# Patient Record
Sex: MALE | Race: WHITE | NOT HISPANIC OR LATINO | Employment: FULL TIME | URBAN - METROPOLITAN AREA
[De-identification: names, ages, dates, MRNs, and addresses within clinical notes are randomized per-mention and may not be internally consistent; named-entity substitution may affect disease eponyms.]

---

## 2017-06-02 ENCOUNTER — OFFICE VISIT (OUTPATIENT)
Dept: URGENT CARE | Facility: PHYSICIAN GROUP | Age: 42
End: 2017-06-02
Payer: COMMERCIAL

## 2017-06-02 VITALS
BODY MASS INDEX: 27.59 KG/M2 | OXYGEN SATURATION: 97 % | HEART RATE: 104 BPM | WEIGHT: 215 LBS | HEIGHT: 74 IN | TEMPERATURE: 98.1 F | RESPIRATION RATE: 14 BRPM | SYSTOLIC BLOOD PRESSURE: 124 MMHG | DIASTOLIC BLOOD PRESSURE: 78 MMHG

## 2017-06-02 DIAGNOSIS — H10.33 ACUTE CONJUNCTIVITIS OF BOTH EYES, UNSPECIFIED ACUTE CONJUNCTIVITIS TYPE: ICD-10-CM

## 2017-06-02 DIAGNOSIS — J01.90 ACUTE SINUSITIS, RECURRENCE NOT SPECIFIED, UNSPECIFIED LOCATION: ICD-10-CM

## 2017-06-02 PROCEDURE — 99214 OFFICE O/P EST MOD 30 MIN: CPT | Performed by: NURSE PRACTITIONER

## 2017-06-02 RX ORDER — AZITHROMYCIN 250 MG/1
TABLET, FILM COATED ORAL
Qty: 6 TAB | Refills: 0 | Status: SHIPPED | OUTPATIENT
Start: 2017-06-02 | End: 2017-11-03

## 2017-06-02 RX ORDER — FLUTICASONE PROPIONATE 50 MCG
2 SPRAY, SUSPENSION (ML) NASAL DAILY
Qty: 1 BOTTLE | Refills: 0 | Status: SHIPPED | OUTPATIENT
Start: 2017-06-02 | End: 2018-11-05

## 2017-06-02 ASSESSMENT — ENCOUNTER SYMPTOMS
COUGH: 0
HOARSE VOICE: 1
BLURRED VISION: 0
CHILLS: 1
SHORTNESS OF BREATH: 0
EYE DISCHARGE: 1
NAUSEA: 0
DIAPHORESIS: 0
SWOLLEN GLANDS: 0
HEADACHES: 1
SINUS PRESSURE: 1
NECK PAIN: 0
DIZZINESS: 0
SORE THROAT: 1
EYE REDNESS: 1

## 2017-06-02 ASSESSMENT — LIFESTYLE VARIABLES: SUBSTANCE_ABUSE: 0

## 2017-06-02 NOTE — MR AVS SNAPSHOT
"        Montana Go   2017 8:35 AM   Office Visit   MRN: [id number]    Department:  [facility]   Dept Phone:  [phone]    Description:  Male : 1975   Provider:  TOBIN Leon           Reason for Visit     Sinus Problem pressure and pain with nasal drainage and a sore throat x 10 days       Allergies as of 2017     No Known Allergies      You were diagnosed with     Acute sinusitis, recurrence not specified, unspecified location   [2134974]       Acute conjunctivitis of both eyes, unspecified acute conjunctivitis type   [[id number]]         Vital Signs     Blood Pressure Pulse Temperature Respirations Height Weight    124/78 mmHg 104 36.7 °C (98.1 °F) 14 1.88 m (6' 2\") 97.523 kg (215 lb)    Body Mass Index Oxygen Saturation Smoking Status             27.59 kg/m2 97% Never Smoker          Basic Information     Date Of Birth Sex Race Ethnicity Preferred Language    1975 Male White Non- English      Problem List              ICD-10-CM Priority Class Noted - Resolved    Essential hypertension I10   11/3/2016 - Present      Health Maintenance        Date Due Completion Dates    IMM DTaP/Tdap/Td Vaccine (1 - Tdap) 1993 ---            Current Immunizations     No immunizations on file.      Below and/or attached are the medications your provider expects you to take. Review all of your home medications and newly ordered medications with your provider and/or pharmacist. Follow medication instructions as directed by your provider and/or pharmacist. Please keep your medication list with you and share with your provider. Update the information when medications are discontinued, doses are changed, or new medications (including over-the-counter products) are added; and carry medication information at all times in the event of emergency situations     Allergies:  No Known Allergies          Medications  Valid as of: 2017 -  9:03 AM    Generic Name Brand " Name Tablet Size Instructions for use    Azithromycin (Tab) ZITHROMAX 250 MG Take 2 tablets PO on day one, then 1 tablet PO on day two to five.        Fluticasone Propionate (Suspension) FLONASE 50 MCG/ACT Spray 2 Sprays in nose every day.        Lisinopril (Tab) PRINIVIL 5 MG Take 1 Tab by mouth every day.        .                 Medicines prescribed today were sent to:     [facility] [address] [place] [address] [id number] [facility] [place] [address]    Phone: [phone] Fax: [phone]    Open 24 Hours?: No      Medication refill instructions:       If your prescription bottle indicates you have medication refills left, it is not necessary to call your provider’s office. Please contact your pharmacy and they will refill your medication.    If your prescription bottle indicates you do not have any refills left, you may request refills at any time through one of the following ways: The online [employer] system (except Urgent Care), by calling your provider’s office, or by asking your pharmacy to contact your provider’s office with a refill request. Medication refills are processed only during regular business hours and may not be available until the next business day. Your provider may request additional information or to have a follow-up visit with you prior to refilling your medication.   *Please Note: Medication refills are assigned a new Rx number when refilled electronically. Your pharmacy may indicate that no refills were authorized even though a new prescription for the same medication is available at the pharmacy. Please request the medicine by name with the pharmacy before contacting your provider for a refill.           [employer] Access Code: Activation code not generated  Current [employer] Status: Active

## 2017-06-02 NOTE — PROGRESS NOTES
"Subjective:      Montana Go is a 42 y.o. male who presents with Sinus Problem    Chief Complaint   Patient presents with   • Sinus Problem     pressure and pain with nasal drainage and a sore throat x 10 days      PFSH reviewed and updated as necessary in EPIC electronic record with patient today.  Medications including OTC medications reviewed with patient.         No Known Allergies          Sinus Problem  This is a new problem. The current episode started 1 to 4 weeks ago. The problem has been rapidly worsening since onset. Maximum temperature: ? His pain is at a severity of 5/10. The pain is moderate. Associated symptoms include chills, congestion, ear pain, headaches, a hoarse voice, sinus pressure, sneezing and a sore throat. Pertinent negatives include no coughing, diaphoresis, neck pain, shortness of breath or swollen glands. Past treatments include acetaminophen and saline sprays (polymyxin eye drops from his wife for the eye redness and drainage. It has not improved his symptoms). The treatment provided mild relief.       Review of Systems   Constitutional: Positive for chills. Negative for diaphoresis.   HENT: Positive for congestion, ear pain, hoarse voice, sinus pressure, sneezing and sore throat.    Eyes: Positive for discharge and redness. Negative for blurred vision.   Respiratory: Negative for cough and shortness of breath.    Gastrointestinal: Negative for nausea.   Musculoskeletal: Negative for neck pain.   Neurological: Positive for headaches. Negative for dizziness.   Endo/Heme/Allergies: Negative for environmental allergies.   Psychiatric/Behavioral: Negative for substance abuse.          Objective:     /78 mmHg  Pulse 104  Temp(Src) 36.7 °C (98.1 °F)  Resp 14  Ht 1.88 m (6' 2\")  Wt 97.523 kg (215 lb)  BMI 27.59 kg/m2  SpO2 97%     Physical Exam   Constitutional: He is oriented to person, place, and time. Vital signs are normal. He appears well-developed and well-nourished.  " Non-toxic appearance. He does not have a sickly appearance. He does not appear ill. No distress.   HENT:   Head: Normocephalic.   Right Ear: Hearing, external ear and ear canal normal. Tympanic membrane is erythematous and bulging.   Left Ear: Hearing, external ear and ear canal normal. Tympanic membrane is erythematous and bulging.   Nose: Mucosal edema and rhinorrhea (purulent discharge at turbinates) present.   Mouth/Throat: Uvula is midline and mucous membranes are normal. Oropharyngeal exudate (PND) and posterior oropharyngeal erythema present. No posterior oropharyngeal edema.   Eyes: Lids are normal. Pupils are equal, round, and reactive to light.   Neck: Trachea normal, normal range of motion, full passive range of motion without pain and phonation normal. Neck supple.   Cardiovascular: Normal rate, regular rhythm, normal heart sounds and normal pulses.    Pulmonary/Chest: Effort normal and breath sounds normal. No respiratory distress.   Abdominal: Soft.   Musculoskeletal: Normal range of motion.   Lymphadenopathy:        Head (right side): No submandibular and no tonsillar adenopathy present.        Head (left side): No submandibular and no tonsillar adenopathy present.     He has no cervical adenopathy.   Neurological: He is alert and oriented to person, place, and time.   Skin: Skin is warm and dry.   Psychiatric: He has a normal mood and affect. His speech is normal and behavior is normal. Judgment and thought content normal.   Nursing note and vitals reviewed.              Assessment/Plan:     1. Acute sinusitis, recurrence not specified, unspecified location  fluticasone (FLONASE) 50 MCG/ACT nasal spray    azithromycin (ZITHROMAX) 250 MG Tab   2. Acute conjunctivitis of both eyes, unspecified acute conjunctivitis type  azithromycin (ZITHROMAX) 250 MG Tab     Educated in proper administration of medication(s) ordered today including safety, possible SE, risks, benefits, rationale and alternatives to  therapy.   Return to clinic or PCP 5-7 days if current symptoms are not resolving in a satisfactory manner or sooner if new or worsening symptoms occur.   Patient and or family advised differential diagnoses, signs and symptoms which would warrant Emergency Department evaluation.   Patient was in agreement with this treatment plan and seemed to understand without barriers. All questions were encouraged and answered  Pt education done. Aftercare instructions given to pt/ caregiver. Questions answered. Verbalizes good understanding.   OTC Saline irrigations or Neti pot rinse. Follow manufacturers recommendations.   OTC antihistamine of choice. Follow manufactures dosing and safety guidelines.   Advised not to take decongestants due to HTN   Humidifier at night prn   Return for reevaluation if she has any of the following symptoms or if current symptoms persist > 10 days:   Fever higher than 102.5°F (39.2°C)   Sudden and severe pain in the face and head   Trouble seeing or seeing double   Trouble thinking clearly   Swelling or redness around 1 or both eyes   Trouble breathing or a stiff neck

## 2017-11-03 ENCOUNTER — OFFICE VISIT (OUTPATIENT)
Dept: MEDICAL GROUP | Facility: PHYSICIAN GROUP | Age: 42
End: 2017-11-03
Payer: COMMERCIAL

## 2017-11-03 VITALS
TEMPERATURE: 97.2 F | DIASTOLIC BLOOD PRESSURE: 84 MMHG | RESPIRATION RATE: 16 BRPM | BODY MASS INDEX: 28.75 KG/M2 | SYSTOLIC BLOOD PRESSURE: 124 MMHG | HEIGHT: 74 IN | OXYGEN SATURATION: 99 % | WEIGHT: 224 LBS | HEART RATE: 74 BPM

## 2017-11-03 DIAGNOSIS — I10 ESSENTIAL HYPERTENSION: ICD-10-CM

## 2017-11-03 DIAGNOSIS — R79.89 LOW SERUM VITAMIN D: ICD-10-CM

## 2017-11-03 DIAGNOSIS — E78.2 MIXED HYPERLIPIDEMIA: ICD-10-CM

## 2017-11-03 PROCEDURE — 99214 OFFICE O/P EST MOD 30 MIN: CPT | Performed by: FAMILY MEDICINE

## 2017-11-03 RX ORDER — LISINOPRIL 5 MG/1
5 TABLET ORAL DAILY
Qty: 90 TAB | Refills: 3 | Status: SHIPPED | OUTPATIENT
Start: 2017-11-03 | End: 2018-11-05 | Stop reason: SDUPTHER

## 2017-11-03 ASSESSMENT — PATIENT HEALTH QUESTIONNAIRE - PHQ9: CLINICAL INTERPRETATION OF PHQ2 SCORE: 0

## 2017-11-03 NOTE — PROGRESS NOTES
"Subjective:   Montana Go is a 42 y.o. male here today forHypertension, hyperlipidemia, low vitamin D    Mixed hyperlipidemia  On going issue: chart review shows that pt has had an elevated total cholesterol and LDL cholesterol. Since her last visit, patient reports that he has been making some lifestyle changes such as choosing healthier fats.    Low serum vitamin D  Ongoing issue. Chart review from last year's labs show the patient had a vitamin D level 21 which is below recommended range of 40. He has not been taking any supplements.    Essential hypertension  Ongoing issue. Patient reports compliance with lisinopril 5 mg daily; denies any dry cough, headache, dizziness, chest pain. Chart review shows the patient's blood pressure and heart rate better in a normal range for his age.         Current medicines (including changes today)  Current Outpatient Prescriptions   Medication Sig Dispense Refill   • lisinopril (PRINIVIL) 5 MG Tab Take 1 Tab by mouth every day. 90 Tab 3   • fluticasone (FLONASE) 50 MCG/ACT nasal spray Spray 2 Sprays in nose every day. 1 Bottle 0     No current facility-administered medications for this visit.      He  has a past medical history of Allergy; GERD (gastroesophageal reflux disease); Heart palpitations; and Hypertension.    ROS   No chest pain, no shortness of breath, no abdominal pain       Objective:     Blood pressure 124/84, pulse 74, temperature 36.2 °C (97.2 °F), resp. rate 16, height 1.88 m (6' 2\"), weight 101.6 kg (224 lb), SpO2 99 %. Body mass index is 28.76 kg/m².   Physical Exam:  Alert, oriented in no acute distress.  Eye contact is good, speech goal directed, affect calm  HEENT: conjunctiva non-injected, sclera non-icteric.  Pinna normal. TM pearly gray.   Oral mucous membranes pink and moist with no lesions.  Neck No adenopathy or masses in the neck or supraclavicular regions.  Lungs: clear to auscultation bilaterally with good excursion.  CV: regular rate and " rhythm.  Abdomen: soft, nontender, No CVAT  Ext: no edema, color normal, vascularity normal, temperature normal  Neuro: Cranial nerves II through XII grossly intact      Assessment and Plan:   The following treatment plan was discussed     1. Essential hypertension  COMP METABOLIC PANEL    MICROALBUMIN CREAT RATIO URINE    Stable. Continue current medications: Refill provided; sent for labs and monitor for results   2. Mixed hyperlipidemia  LIPID PROFILE    Uncontrolled. Send for new labs and monitor results   3. Low serum vitamin D  VITAMIN D,25 HYDROXY    Uncontrolled. Send for new labs and monitor results       Followup: Return in about 1 year (around 11/3/2018) for HTN, Short.

## 2017-11-03 NOTE — ASSESSMENT & PLAN NOTE
Ongoing issue. Patient reports compliance with lisinopril 5 mg daily; denies any dry cough, headache, dizziness, chest pain. Chart review shows the patient's blood pressure and heart rate better in a normal range for his age.

## 2017-11-03 NOTE — ASSESSMENT & PLAN NOTE
Ongoing issue. Chart review from last year's labs show the patient had a vitamin D level 21 which is below recommended range of 40. He has not been taking any supplements.

## 2017-11-03 NOTE — ASSESSMENT & PLAN NOTE
On going issue: chart review shows that pt has had an elevated total cholesterol and LDL cholesterol. Since her last visit, patient reports that he has been making some lifestyle changes such as choosing healthier fats.

## 2017-11-07 ENCOUNTER — HOSPITAL ENCOUNTER (OUTPATIENT)
Dept: LAB | Facility: MEDICAL CENTER | Age: 42
End: 2017-11-07
Attending: FAMILY MEDICINE
Payer: COMMERCIAL

## 2017-11-07 DIAGNOSIS — R79.89 LOW SERUM VITAMIN D: ICD-10-CM

## 2017-11-07 DIAGNOSIS — E78.2 MIXED HYPERLIPIDEMIA: ICD-10-CM

## 2017-11-07 DIAGNOSIS — I10 ESSENTIAL HYPERTENSION: ICD-10-CM

## 2017-11-07 LAB
25(OH)D3 SERPL-MCNC: 14 NG/ML (ref 30–100)
ALBUMIN SERPL BCP-MCNC: 4.4 G/DL (ref 3.2–4.9)
ALBUMIN/GLOB SERPL: 1.5 G/DL
ALP SERPL-CCNC: 36 U/L (ref 30–99)
ALT SERPL-CCNC: 36 U/L (ref 2–50)
ANION GAP SERPL CALC-SCNC: 8 MMOL/L (ref 0–11.9)
AST SERPL-CCNC: 25 U/L (ref 12–45)
BILIRUB SERPL-MCNC: 0.7 MG/DL (ref 0.1–1.5)
BUN SERPL-MCNC: 18 MG/DL (ref 8–22)
CALCIUM SERPL-MCNC: 9.2 MG/DL (ref 8.5–10.5)
CHLORIDE SERPL-SCNC: 104 MMOL/L (ref 96–112)
CHOLEST SERPL-MCNC: 209 MG/DL (ref 100–199)
CO2 SERPL-SCNC: 26 MMOL/L (ref 20–33)
CREAT SERPL-MCNC: 1.12 MG/DL (ref 0.5–1.4)
CREAT UR-MCNC: 264 MG/DL
GFR SERPL CREATININE-BSD FRML MDRD: >60 ML/MIN/1.73 M 2
GLOBULIN SER CALC-MCNC: 2.9 G/DL (ref 1.9–3.5)
GLUCOSE SERPL-MCNC: 97 MG/DL (ref 65–99)
HDLC SERPL-MCNC: 47 MG/DL
LDLC SERPL CALC-MCNC: 117 MG/DL
MICROALBUMIN UR-MCNC: <0.7 MG/DL
MICROALBUMIN/CREAT UR: NORMAL MG/G (ref 0–30)
POTASSIUM SERPL-SCNC: 4.2 MMOL/L (ref 3.6–5.5)
PROT SERPL-MCNC: 7.3 G/DL (ref 6–8.2)
SODIUM SERPL-SCNC: 138 MMOL/L (ref 135–145)
TRIGL SERPL-MCNC: 226 MG/DL (ref 0–149)

## 2017-11-07 PROCEDURE — 80061 LIPID PANEL: CPT

## 2017-11-07 PROCEDURE — 82570 ASSAY OF URINE CREATININE: CPT

## 2017-11-07 PROCEDURE — 36415 COLL VENOUS BLD VENIPUNCTURE: CPT

## 2017-11-07 PROCEDURE — 82306 VITAMIN D 25 HYDROXY: CPT

## 2017-11-07 PROCEDURE — 82043 UR ALBUMIN QUANTITATIVE: CPT

## 2017-11-07 PROCEDURE — 80053 COMPREHEN METABOLIC PANEL: CPT

## 2018-11-05 ENCOUNTER — OFFICE VISIT (OUTPATIENT)
Dept: MEDICAL GROUP | Facility: PHYSICIAN GROUP | Age: 43
End: 2018-11-05
Payer: COMMERCIAL

## 2018-11-05 VITALS
TEMPERATURE: 98.2 F | HEART RATE: 77 BPM | HEIGHT: 74 IN | BODY MASS INDEX: 28.88 KG/M2 | WEIGHT: 225 LBS | SYSTOLIC BLOOD PRESSURE: 128 MMHG | DIASTOLIC BLOOD PRESSURE: 82 MMHG | OXYGEN SATURATION: 96 %

## 2018-11-05 DIAGNOSIS — Z23 NEED FOR VACCINATION: ICD-10-CM

## 2018-11-05 DIAGNOSIS — I10 ESSENTIAL HYPERTENSION: ICD-10-CM

## 2018-11-05 DIAGNOSIS — E78.2 MIXED HYPERLIPIDEMIA: ICD-10-CM

## 2018-11-05 DIAGNOSIS — R00.2 HEART PALPITATIONS: ICD-10-CM

## 2018-11-05 DIAGNOSIS — R79.89 LOW SERUM VITAMIN D: ICD-10-CM

## 2018-11-05 DIAGNOSIS — Z76.89 ESTABLISHING CARE WITH NEW DOCTOR, ENCOUNTER FOR: ICD-10-CM

## 2018-11-05 PROCEDURE — 90471 IMMUNIZATION ADMIN: CPT | Performed by: NURSE PRACTITIONER

## 2018-11-05 PROCEDURE — 90715 TDAP VACCINE 7 YRS/> IM: CPT | Performed by: NURSE PRACTITIONER

## 2018-11-05 PROCEDURE — 99214 OFFICE O/P EST MOD 30 MIN: CPT | Mod: 25 | Performed by: NURSE PRACTITIONER

## 2018-11-05 RX ORDER — LISINOPRIL 5 MG/1
5 TABLET ORAL DAILY
Qty: 90 TAB | Refills: 3 | Status: SHIPPED | OUTPATIENT
Start: 2018-11-05 | End: 2019-11-04 | Stop reason: SDUPTHER

## 2018-11-05 RX ORDER — PROPRANOLOL HYDROCHLORIDE 10 MG/1
10 TABLET ORAL NIGHTLY PRN
Qty: 90 TAB | Refills: 0 | Status: SHIPPED | OUTPATIENT
Start: 2018-11-05 | End: 2019-11-04

## 2018-11-05 RX ORDER — CETIRIZINE HYDROCHLORIDE 10 MG/1
10 TABLET ORAL DAILY
COMMUNITY

## 2018-11-05 ASSESSMENT — PATIENT HEALTH QUESTIONNAIRE - PHQ9: CLINICAL INTERPRETATION OF PHQ2 SCORE: 0

## 2018-11-05 NOTE — ASSESSMENT & PLAN NOTE
This is a chronic problem for the patient for about 10 years that is controlled with lisinopril 5 mg daily.  His blood pressure today is 128/82 with a heart rate of 77.  The patient reports that he does check his blood pressure at home and when he is at rest it ranges from 120-135/70-83.  He reports that he is taking his medications as prescribed and denies any side effects of the medications.  The patient has been experiencing increased frequency of heart palpitations which he will take his blood pressure and pulse during an event and his blood pressure will be 145/87 and his heart rate will range from .

## 2018-11-05 NOTE — ASSESSMENT & PLAN NOTE
This is a chronic problem for the patient that is uncontrolled and was first noted in November 2016 with a vitamin D level of 21 and again in November 2017 with a vitamin D level of 14.  The patient reports that he is taking an over-the-counter vitamin D supplements with 500 units/day.

## 2018-11-05 NOTE — ASSESSMENT & PLAN NOTE
"This is a chronic problem for the patient that is worsening.  The patient had an episode of palpitations a few years ago for which he had a workup for and was cleared by MDs for this issue after advising him to cut out caffeine.  The patient has now noticed since mid summer that a few nights per week he will wake up and feel like his heart is racing and pounding and his body feels tingly.  He states he will try to fall back to sleep but the sensations continue for a few hours until they subside and he is finally able to fall asleep.  During these episodes he will use his phone to check his heart rate and it will show his heart rate ranges from , he notes that his \"heart rate is not getting faster it just feels different\".  He has checked his blood pressure during an episode and it was 145/87.  He also reports that sometimes after walking into work he feels a feeling in his chest that is not chest pain \"it is just different\" and it will cause him to feel woozy for a few minutes.  He denies any loss of consciousness, dizziness, nausea, vomiting, or chest pain.  He denies any life events that happened in mid summer to aggravate this issue and he has not been able to correlate any events, activities, food, alcohol with the symptoms.  He states that at nights where he drinks 0 alcohol he will have the symptoms the same as the nights where he has some alcohol.  He has continued to not drink caffeine after he was advised in the past, only drinks decaf coffee in the morning.  He did buy an over-the-counter supplement kava kava as it was recommended to him that it helps decrease stress, but he was unsure if it is safe to take.  He does report that his dad had a history of atrial fibrillation with blood clots in his left lower extremity and he required 2 cardiac ablations.  "

## 2018-11-05 NOTE — PROGRESS NOTES
"CC:   Chief Complaint   Patient presents with   • Establish Care   • Hypertension   • Palpitations       HISTORY OF THE PRESENT ILLNESS: Patient is a 43 y.o. male. This pleasant patient is here today to establish care and discuss multiple issues as listed below.      Health Maintenance: Completed    Heart palpitations  This is a chronic problem for the patient that is worsening.  The patient had an episode of palpitations a few years ago for which he had a workup for and was cleared by MDs for this issue after advising him to cut out caffeine.  The patient has now noticed since mid summer that a few nights per week he will wake up and feel like his heart is racing and pounding and his body feels tingly.  He states he will try to fall back to sleep but the sensations continue for a few hours until they subside and he is finally able to fall asleep.  During these episodes he will use his phone to check his heart rate and it will show his heart rate ranges from , he notes that his \"heart rate is not getting faster it just feels different\".  He has checked his blood pressure during an episode and it was 145/87.  He also reports that sometimes after walking into work he feels a feeling in his chest that is not chest pain \"it is just different\" and it will cause him to feel woozy for a few minutes.  He denies any loss of consciousness, dizziness, nausea, vomiting, or chest pain.  He denies any life events that happened in mid summer to aggravate this issue and he has not been able to correlate any events, activities, food, alcohol with the symptoms.  He states that at nights where he drinks 0 alcohol he will have the symptoms the same as the nights where he has some alcohol.  He has continued to not drink caffeine after he was advised in the past, only drinks decaf coffee in the morning.  He did buy an over-the-counter supplement kava kava as it was recommended to him that it helps decrease stress, but he was unsure " if it is safe to take.  He does report that his dad had a history of atrial fibrillation with blood clots in his left lower extremity and he required 2 cardiac ablations.    Essential hypertension  This is a chronic problem for the patient for about 10 years that is controlled with lisinopril 5 mg daily.  His blood pressure today is 128/82 with a heart rate of 77.  The patient reports that he does check his blood pressure at home and when he is at rest it ranges from 120-135/70-83.  He reports that he is taking his medications as prescribed and denies any side effects of the medications.  The patient has been experiencing increased frequency of heart palpitations which he will take his blood pressure and pulse during an event and his blood pressure will be 145/87 and his heart rate will range from .    Mixed hyperlipidemia  This is a chronic problem for the patient that is uncontrolled. The patient's lipid panel in 11/15/2016 showed total cholesterol 226, triglycerides 134, HDL 50, .  Repeat in 11/7/2017 showed total cholesterol 209, triglycerides 226, HDL 47, .  Patient reports that he has been trying to make healthier food choices as well as increasing his activity levels.  He has not been on a medication for this issue.    Low serum vitamin D  This is a chronic problem for the patient that is uncontrolled and was first noted in November 2016 with a vitamin D level of 21 and again in November 2017 with a vitamin D level of 14.  The patient reports that he is taking an over-the-counter vitamin D supplements with 500 units/day.    Allergies: Patient has no known allergies.    Current Outpatient Prescriptions Ordered in [facility]   Medication Sig Dispense Refill   • Cholecalciferol (VITAMIN D) 400 UNIT/ML Liquid Take  by mouth.     • cetirizine (ZYRTEC) 10 MG Tab Take 10 mg by mouth every day.     • lisinopril (PRINIVIL) 5 MG Tab Take 1 Tab by mouth every day. 90 Tab 3   • propranolol (INDERAL) 10 MG  Tab Take 1 Tab by mouth at bedtime as needed (palpitations and anxiety). 90 Tab 0     No current Epic-ordered facility-administered medications on file.        Past Medical History:   Diagnosis Date   • Allergy    • GERD (gastroesophageal reflux disease)    • Heart palpitations    • Hiatal hernia     small   • Hypertension        Past Surgical History:   Procedure Laterality Date   • EYE SURGERY Bilateral 09/2018    lasik   • VASECTOMY  2004   • HERNIA REPAIR Right     inguinal   • HYDROCELECTOMY ADULT         Social History   Substance Use Topics   • Smoking status: Never Smoker   • Smokeless tobacco: Never Used   • Alcohol use 6.0 oz/week     10 Cans of beer per week      Comment: weekly       Social History     Social History Narrative   • No narrative on file       Family History   Problem Relation Age of Onset   • Hypertension Mother    • Hyperlipidemia Mother    • Hypertension Father    • Heart Disease Father         Afib, blood clots, cardiac ablation   • Other Sister         cholecystectomy   • Cancer Paternal Aunt         colon ca   • Heart Disease Paternal Grandmother         open heart surgery   • Other Paternal Grandmother         liver cirrhosis   • Cancer Paternal Grandfather    • Stroke Cousin    • Alcohol/Drug Maternal Uncle    • Diabetes Maternal Uncle    • Hypertension Maternal Grandfather        ROS:     - Constitutional:  Negative for fever, chills, unexpected weight change, night sweats, body aches, sleep issues,  and fatigue/generalized weakness.     - HEENT: Positive for sinus congestion and postnasal drainage.  Negative for headaches, vision changes, hearing changes, ear pain, tinnitus, ear discharge, rhinorrhea, sinus congestion, sneezing, sore throat, and neck pain.      - Respiratory:  Negative for cough, shortness of breath, sputum production, hemoptysis, chest congestion, dyspnea, wheezing, and crackles.      - Cardiovascular: Currently negative for palpitations and negative for chest  "pain, SHOOK, paroxsymal nocturnal dyspnea, orthopnea, and bilateral lower extremity edema.     - Gastrointestinal:  Negative for heartburn, nausea, vomiting, abdominal pain, hematochezia, melena, diarrhea, constipation, and greasy/foul-smelling stools.     - Genitourinary:  Negative for dysuria, nocturia, polyuria, hematuria, pyuria, urinary urgency, urinary frequency, and urinary incontinence.     - Musculoskeletal:  Negative for myalgias, back pain, and joint pain.     - Skin:  Negative for rash, sores, lumps, itching, cyanotic skin color change.     - Neurological:  Negative for dizziness, tingling, tremors, focal sensory deficit, focal weakness and headaches.     - Endo/Heme/Allergies:  Does not bruise/bleed easily. Denies cold/heat intolerance.     - Psychiatric/Behavioral: Negative for depression, suicidal/homicidal ideation and memory loss.        Exam: Blood pressure 128/82, pulse 77, temperature 36.8 °C (98.2 °F), temperature source Temporal, height 1.88 m (6' 2\"), weight 102.1 kg (225 lb), SpO2 96 %. Body mass index is 28.89 kg/m².    General:  Normal appearing. No distress.  HEENT:  Normocephalic. Eyes conjunctiva clear lids without ptosis, pupils equal and reactive to light accommodation, ears normal shape and contour.   Pulmonary:  Clear to ausculation.  Normal effort. No rales, ronchi, or wheezing.  Cardiovascular:  Regular rate and rhythm without murmur. Carotid and radial pulses are intact and equal bilaterally.  Neurologic:  Grossly nonfocal  Skin:  Warm and dry.  No obvious lesions.  Musculoskeletal:  Normal gait. No extremity cyanosis, clubbing, or edema.  Psych:  Normal mood and affect. Alert and oriented x3. Judgment and insight is normal.    Assessment/Plan  1. Heart palpitations  This is a chronic problem for the patient that is now worsening.  Patient denies that he is having chest pain symptoms, he explains the sensation as \"it just feels different\".  He reports that he feels that his heart is " racing but when he checks his heart rate on his phone heart rate monitor the rate ranges from .  The patient is concerned that it has started mid summer and has increased in frequency and is unpredictable.  The patient is requesting a referral to cardiology to have this further worked up, referral provided.  Prescription for propanolol 10 mg to be taken 1 tab as needed at night for palpitations and anxiety.  Patient will notify me if this medication helps reduce symptoms or not.  - REFERRAL TO CARDIOLOGY  - propranolol (INDERAL) 10 MG Tab; Take 1 Tab by mouth at bedtime as needed (palpitations and anxiety).  Dispense: 90 Tab; Refill: 0    2. Essential hypertension  This is a chronic problem for the patient that is controlled using lisinopril 5 mg tab daily.  Patient denies any side effects of this medication.  He is requesting a refill at this time, refill provided.  We will plan to recheck a CMP prior to his follow-up appointment in December.  - lisinopril (PRINIVIL) 5 MG Tab; Take 1 Tab by mouth every day.  Dispense: 90 Tab; Refill: 3  - COMP METABOLIC PANEL; Future    3. Mixed hyperlipidemia  This is a chronic problem for the patient's is uncontrolled.  He has not taken any medications for this issue, however he has made lifestyle changes.  We will plan to recheck a lipid profile prior to his follow-up appointment in December.  - LIPID PROFILE; Future    4. Low serum vitamin D  This is a chronic problem for the patient that is uncontrolled.  The patient's last 2 vitamin D levels have been low, he has been taking an over-the-counter supplements.  We will plan to recheck vitamin D prior to his follow-up appointment in December.  - VITAMIN D,25 HYDROXY; Future    5. Need for vaccination  Given today.  - Tdap =>8yo IM    6. Establishing care with new doctor, encounter for    Educated in proper administration of medication(s) ordered today including safety, possible SE, risks, benefits, rationale and  alternatives to therapy.   Supportive care, differential diagnoses, and indications for immediate follow-up discussed with patient.    Pathogenesis of diagnosis discussed including typical length and natural progression.    Instructed to return to clinic or nearest emergency department for any change in condition, further concerns, or worsening of symptoms.  Patient states understanding of the plan of care and discharge instructions.    Return in 4 weeks (on 12/3/2018) for Preventative Annual.    I have placed the below orders and discussed them with an approved delegating provider. The MA is performing the below orders under the direction of Dr. Luna.    Please note that this dictation was created using voice recognition software. I have made every reasonable attempt to correct obvious errors, but I expect that there are errors of grammar and possibly content that I did not discover before finalizing the note.

## 2018-11-05 NOTE — ASSESSMENT & PLAN NOTE
This is a chronic problem for the patient that is uncontrolled. The patient's lipid panel in 11/15/2016 showed total cholesterol 226, triglycerides 134, HDL 50, .  Repeat in 11/7/2017 showed total cholesterol 209, triglycerides 226, HDL 47, .  Patient reports that he has been trying to make healthier food choices as well as increasing his activity levels.  He has not been on a medication for this issue.

## 2018-11-26 ENCOUNTER — HOSPITAL ENCOUNTER (OUTPATIENT)
Dept: LAB | Facility: MEDICAL CENTER | Age: 43
End: 2018-11-26
Attending: NURSE PRACTITIONER
Payer: COMMERCIAL

## 2018-11-26 DIAGNOSIS — R79.89 LOW SERUM VITAMIN D: ICD-10-CM

## 2018-11-26 DIAGNOSIS — E78.2 MIXED HYPERLIPIDEMIA: ICD-10-CM

## 2018-11-26 DIAGNOSIS — I10 ESSENTIAL HYPERTENSION: ICD-10-CM

## 2018-11-26 LAB — 25(OH)D3 SERPL-MCNC: 35 NG/ML (ref 30–100)

## 2018-11-26 PROCEDURE — 82306 VITAMIN D 25 HYDROXY: CPT

## 2018-11-26 PROCEDURE — 80061 LIPID PANEL: CPT

## 2018-11-26 PROCEDURE — 80053 COMPREHEN METABOLIC PANEL: CPT

## 2018-11-26 PROCEDURE — 36415 COLL VENOUS BLD VENIPUNCTURE: CPT

## 2018-11-27 LAB
ALBUMIN SERPL BCP-MCNC: 4.4 G/DL (ref 3.2–4.9)
ALBUMIN/GLOB SERPL: 2 G/DL
ALP SERPL-CCNC: 32 U/L (ref 30–99)
ALT SERPL-CCNC: 34 U/L (ref 2–50)
ANION GAP SERPL CALC-SCNC: 6 MMOL/L (ref 0–11.9)
AST SERPL-CCNC: 21 U/L (ref 12–45)
BILIRUB SERPL-MCNC: 0.6 MG/DL (ref 0.1–1.5)
BUN SERPL-MCNC: 16 MG/DL (ref 8–22)
CALCIUM SERPL-MCNC: 9.3 MG/DL (ref 8.5–10.5)
CHLORIDE SERPL-SCNC: 106 MMOL/L (ref 96–112)
CHOLEST SERPL-MCNC: 222 MG/DL (ref 100–199)
CO2 SERPL-SCNC: 28 MMOL/L (ref 20–33)
CREAT SERPL-MCNC: 1.19 MG/DL (ref 0.5–1.4)
FASTING STATUS PATIENT QL REPORTED: NORMAL
GLOBULIN SER CALC-MCNC: 2.2 G/DL (ref 1.9–3.5)
GLUCOSE SERPL-MCNC: 89 MG/DL (ref 65–99)
HDLC SERPL-MCNC: 43 MG/DL
LDLC SERPL CALC-MCNC: 127 MG/DL
POTASSIUM SERPL-SCNC: 4.5 MMOL/L (ref 3.6–5.5)
PROT SERPL-MCNC: 6.6 G/DL (ref 6–8.2)
SODIUM SERPL-SCNC: 140 MMOL/L (ref 135–145)
TRIGL SERPL-MCNC: 259 MG/DL (ref 0–149)

## 2018-12-03 ENCOUNTER — OFFICE VISIT (OUTPATIENT)
Dept: MEDICAL GROUP | Facility: PHYSICIAN GROUP | Age: 43
End: 2018-12-03
Payer: COMMERCIAL

## 2018-12-03 VITALS
SYSTOLIC BLOOD PRESSURE: 116 MMHG | HEART RATE: 71 BPM | BODY MASS INDEX: 28.88 KG/M2 | HEIGHT: 74 IN | TEMPERATURE: 97.2 F | DIASTOLIC BLOOD PRESSURE: 64 MMHG | WEIGHT: 225 LBS | OXYGEN SATURATION: 96 %

## 2018-12-03 DIAGNOSIS — R00.2 HEART PALPITATIONS: ICD-10-CM

## 2018-12-03 DIAGNOSIS — I10 ESSENTIAL HYPERTENSION: ICD-10-CM

## 2018-12-03 DIAGNOSIS — E78.2 MIXED HYPERLIPIDEMIA: ICD-10-CM

## 2018-12-03 DIAGNOSIS — R79.89 LOW SERUM VITAMIN D: ICD-10-CM

## 2018-12-03 DIAGNOSIS — K21.9 GASTROESOPHAGEAL REFLUX DISEASE WITHOUT ESOPHAGITIS: ICD-10-CM

## 2018-12-03 PROCEDURE — 99396 PREV VISIT EST AGE 40-64: CPT | Performed by: NURSE PRACTITIONER

## 2018-12-03 NOTE — ASSESSMENT & PLAN NOTE
This is a chronic problem for the patient that is controlled.  The patient's most recent vitamin D level on 11/26/2018 was 35.  The patient continues to take a vitamin D supplement over-the-counter 500 units/day.

## 2018-12-03 NOTE — ASSESSMENT & PLAN NOTE
This is a chronic problem for the patient that is uncontrolled.  Patient reports that he has acid reflux symptoms daily and has been controlling his symptoms with over-the-counter Tums.  He reports that in the past he had taken omeprazole but it gave him diarrhea so he stopped.  The patient denies waking up with a cough, however he does sleep with his head elevated.  He has had an EGD in the past and was told that he has a small hiatal hernia.

## 2018-12-03 NOTE — ASSESSMENT & PLAN NOTE
This is a chronic problem for the patient that is uncontrolled. The patient's most recent lipid profile was completed on 11/26/2018.  Total cholesterol was 222, triglycerides 255, HDL 43, .  The patient's 10-year cardiac risk score is 2.59%.  The patient is interested in making lifestyle changes, increasing his activity levels, losing weight, improving his diet.  He would like to try over-the-counter supplements such as fish oil at this time to try to avoid going on a statin.

## 2018-12-03 NOTE — ASSESSMENT & PLAN NOTE
This is a chronic problem for the patient that is improving.  The patient had previously reported that he had history of heart palpitations a few years ago that he was seeing cardiology for.  The problem had mostly resolved until this past summer when he was having heart palpitations a few nights per week which were waking him up.  The patient was seen by me on 11/5/2018 and was prescribed propanolol 10 mg to be taken 1 time at night as needed.  The patient states that he has taken the propanolol in the evening about 9 times in the past month and reports that the medication helps.  He is asking if he can take this medication during the day if he has that feeling of a racing heart.  He does have an appointment with cardiology on 1/15/2019 for this issue.

## 2018-12-03 NOTE — ASSESSMENT & PLAN NOTE
This is a chronic problem for the patient that is controlled.  The patient's blood pressure today is 116/64 with a heart rate of 71.  Patient continues to take lisinopril 5 mg/day, denies any side effects of the medication including a cough.  The patient does not need a refill of this medication at this time.  He will be having a consultation appointment with cardiology on 1/15/2019 regarding his palpitations. The patient denies chest pain, shortness of breath, headaches, dizziness, blurry vision, or dyspnea on exertion.

## 2019-01-15 ENCOUNTER — OFFICE VISIT (OUTPATIENT)
Dept: CARDIOLOGY | Facility: MEDICAL CENTER | Age: 44
End: 2019-01-15
Payer: COMMERCIAL

## 2019-01-15 VITALS
SYSTOLIC BLOOD PRESSURE: 150 MMHG | BODY MASS INDEX: 29 KG/M2 | DIASTOLIC BLOOD PRESSURE: 80 MMHG | HEIGHT: 74 IN | OXYGEN SATURATION: 96 % | HEART RATE: 96 BPM | WEIGHT: 226 LBS

## 2019-01-15 DIAGNOSIS — F41.9 ANXIETY: ICD-10-CM

## 2019-01-15 DIAGNOSIS — I10 ESSENTIAL HYPERTENSION: ICD-10-CM

## 2019-01-15 DIAGNOSIS — R00.2 PALPITATIONS: ICD-10-CM

## 2019-01-15 LAB — EKG IMPRESSION: NORMAL

## 2019-01-15 PROCEDURE — 93000 ELECTROCARDIOGRAM COMPLETE: CPT | Performed by: INTERNAL MEDICINE

## 2019-01-15 PROCEDURE — 99244 OFF/OP CNSLTJ NEW/EST MOD 40: CPT | Performed by: INTERNAL MEDICINE

## 2019-01-15 RX ORDER — METOPROLOL SUCCINATE 25 MG/1
25 TABLET, EXTENDED RELEASE ORAL DAILY
Qty: 90 TAB | Refills: 3 | Status: SHIPPED | OUTPATIENT
Start: 2019-01-15 | End: 2019-11-04 | Stop reason: SDUPTHER

## 2019-01-15 NOTE — PROGRESS NOTES
Chief Complaint   Patient presents with   • Palpitations     NP       Subjective:   [place] oG is a 43 y.o. male who presents today for initial consultation regarding palpitations.  He is a  at [employer] with a history of hypertension moderately well controlled who developed palpitations for many years but has noticed that they have been slightly more prominent lately occurring several times per week.  They are characterized as a flip-flop sensation that does not sustain, worse in the morning, is exacerbated by caffeine and stress.  He finds that he does have quite a bit of anxiety in general.  Does not exercise routinely but does get significant amounts of physical activity at work.  He has no exertional symptoms.  She is a non-smoker who does not use drugs and who drinks 2-3 beers a day and notes that this does not affect his symptoms.  His family history of coronary artery disease although not precocious.    Denies any other cardiovascular symptoms including chest pain, shortness of breath, dyspnea on exertion, lightheadedness, syncope or presyncope, lower extremity edema, PND, orthopnea.      Past Medical History:   Diagnosis Date   • Allergy    • GERD (gastroesophageal reflux disease)    • Heart palpitations    • Hiatal hernia     small   • Hypertension      Past Surgical History:   Procedure Laterality Date   • EYE SURGERY Bilateral 09/2018    lasik   • VASECTOMY  2004   • HERNIA REPAIR Right     inguinal   • HYDROCELECTOMY ADULT       Family History   Problem Relation Age of Onset   • Hypertension Mother    • Hyperlipidemia Mother    • Hypertension Father    • Heart Disease Father         Afib, blood clots, cardiac ablation   • Other Sister         cholecystectomy   • Cancer Paternal Aunt         colon ca   • Heart Disease Paternal Grandmother         open heart surgery   • Other Paternal Grandmother         liver cirrhosis   • Cancer Paternal Grandfather    • Stroke Cousin    • Alcohol/Drug  "Maternal Uncle    • Diabetes Maternal Uncle    • Hypertension Maternal Grandfather      Social History     Social History   • Marital status:      Spouse name: N/A   • Number of children: N/A   • Years of education: N/A     Occupational History   • Not on file.     Social History Main Topics   • Smoking status: Never Smoker   • Smokeless tobacco: Never Used   • Alcohol use 6.0 oz/week     10 Cans of beer per week      Comment: weekly   • Drug use: No   • Sexual activity: Yes     Partners: Female     Birth control/ protection: Surgical      Comment:  22, dtr 17 son 14     Other Topics Concern   • Not on file     Social History Narrative   • No narrative on file     No Known Allergies  Outpatient Encounter Prescriptions as of 1/15/2019   Medication Sig Dispense Refill   • metoprolol SR (TOPROL XL) 25 MG TABLET SR 24 HR Take 1 Tab by mouth every day. 90 Tab 3   • Cholecalciferol (VITAMIN D) 400 UNIT/ML Liquid Take  by mouth.     • cetirizine (ZYRTEC) 10 MG Tab Take 10 mg by mouth every day.     • lisinopril (PRINIVIL) 5 MG Tab Take 1 Tab by mouth every day. 90 Tab 3   • propranolol (INDERAL) 10 MG Tab Take 1 Tab by mouth at bedtime as needed (palpitations and anxiety). 90 Tab 0     No facility-administered encounter medications on file as of 1/15/2019.      Review of Systems   All other systems reviewed and are negative.       Objective:   /80 (BP Location: Left arm, Patient Position: Sitting, BP Cuff Size: Adult)   Pulse 96   Ht 1.88 m (6' 2\")   Wt 102.5 kg (226 lb)   SpO2 96%   BMI 29.02 kg/m²     Physical Exam   Constitutional: He is oriented to person, place, and time. He appears well-developed and well-nourished. No distress.   HENT:   Head: Normocephalic and atraumatic.   Right Ear: External ear normal.   Left Ear: External ear normal.   Eyes: Pupils are equal, round, and reactive to light. Conjunctivae and EOM are normal. Right eye exhibits no discharge. Left eye exhibits no discharge. " No scleral icterus.   Neck: Normal range of motion. Neck supple. No JVD present. No tracheal deviation present. No thyromegaly present.   Cardiovascular: Normal rate, regular rhythm and intact distal pulses.  PMI is not displaced.  Exam reveals no gallop and no friction rub.    No murmur heard.  Pulses:       Carotid pulses are 2+ on the right side, and 2+ on the left side.       Radial pulses are 2+ on the left side.        Popliteal pulses are 2+ on the right side, and 2+ on the left side.        Dorsalis pedis pulses are 2+ on the right side, and 2+ on the left side.        Posterior tibial pulses are 2+ on the right side, and 2+ on the left side.   Pulmonary/Chest: Effort normal and breath sounds normal. No respiratory distress. He has no wheezes. He has no rales. He exhibits no tenderness.   Abdominal: Soft. Bowel sounds are normal. He exhibits no distension. There is no tenderness.   Musculoskeletal: Normal range of motion. He exhibits no edema, tenderness or deformity.   Neurological: He is alert and oriented to person, place, and time. No cranial nerve deficit (cranial nerves II through XII grossly intact). Coordination normal.   Skin: Skin is warm and dry. No rash noted. He is not diaphoretic. No erythema. No pallor.   Psychiatric: He has a normal mood and affect. His behavior is normal. Thought content normal.   Vitals reviewed.    LABS:  Lab Results   Component Value Date/Time    CHOLSTRLTOT 222 (H) 11/26/2018 06:15 AM     (H) 11/26/2018 06:15 AM    HDL 43 11/26/2018 06:15 AM    TRIGLYCERIDE 259 (H) 11/26/2018 06:15 AM       No results found for: WBC, RBC, HEMOGLOBIN, HEMATOCRIT, MCV, NEUTSPOLYS, LYMPHOCYTES, MONOCYTES, EOSINOPHILS, BASOPHILS, HYPOCHROMIA, ANISOCYTOSIS  Lab Results   Component Value Date/Time    SODIUM 140 11/26/2018 06:15 AM    POTASSIUM 4.5 11/26/2018 06:15 AM    CHLORIDE 106 11/26/2018 06:15 AM    CO2 28 11/26/2018 06:15 AM    GLUCOSE 89 11/26/2018 06:15 AM    BUN 16 11/26/2018  06:15 AM    CREATININE 1.19 2018 06:15 AM         Lab Results   Component Value Date/Time    ALKPHOSPHAT 32 2018 06:15 AM    ASTSGOT 21 2018 06:15 AM    ALTSGPT 34 2018 06:15 AM    TBILIRUBIN 0.6 2018 06:15 AM      No results found for: BNPBTYPENAT   No results found for: TSH  No results found for: PROTHROMBTM, INR     EKG (1/15/2019):  I have personally reviewed the EKG this visit and discussed with the patient.  And his rhythm borderline left atrial abnormality, heart rate 91 bpm.    Assessment:     1. Palpitations  EKG    EC-ECHOCARDIOGRAM COMPLETE W/O CONT    metoprolol SR (TOPROL XL) 25 MG TABLET SR 24 HR   2. Essential hypertension  EC-ECHOCARDIOGRAM COMPLETE W/O CONT    metoprolol SR (TOPROL XL) 25 MG TABLET SR 24 HR   3. Anxiety         Medical Decision Making:  Today's Assessment / Status / Plan:     His palpitations have no red flag symptoms.  He has not had a thyroid evaluation that I am aware of.  His EKG shows possible left atrial enlargement.  This is likely related to hypertension and may not be accurate as the EKG tends overcall this.  His symptoms are improved but not resolved with as needed propranolol.  I suggest the followin.  TSH, free T4, echocardiogram  2.  Metoprolol 25 mg XL daily scheduled, can increase to twice daily if needed.  3.  Anxiety management and increase physical activity  4.  Avoidance of stimulants, he Anshul does    Recommend he follow-up next year if sooner if he is having ongoing issues.

## 2019-01-23 ENCOUNTER — HOSPITAL ENCOUNTER (OUTPATIENT)
Dept: LAB | Facility: MEDICAL CENTER | Age: 44
End: 2019-01-23
Attending: NURSE PRACTITIONER
Payer: COMMERCIAL

## 2019-01-23 DIAGNOSIS — R00.2 PALPITATIONS: ICD-10-CM

## 2019-01-23 DIAGNOSIS — I10 ESSENTIAL HYPERTENSION: ICD-10-CM

## 2019-01-23 LAB — TSH SERPL DL<=0.005 MIU/L-ACNC: 3.88 UIU/ML (ref 0.38–5.33)

## 2019-01-23 PROCEDURE — 36415 COLL VENOUS BLD VENIPUNCTURE: CPT

## 2019-01-23 PROCEDURE — 84443 ASSAY THYROID STIM HORMONE: CPT

## 2019-01-28 ENCOUNTER — HOSPITAL ENCOUNTER (OUTPATIENT)
Dept: CARDIOLOGY | Facility: MEDICAL CENTER | Age: 44
End: 2019-01-28
Attending: INTERNAL MEDICINE
Payer: COMMERCIAL

## 2019-01-28 DIAGNOSIS — R00.2 PALPITATIONS: ICD-10-CM

## 2019-01-28 DIAGNOSIS — I10 ESSENTIAL HYPERTENSION: ICD-10-CM

## 2019-01-28 LAB
LV EJECT FRACT  99904: 65
LV EJECT FRACT MOD 2C 99903: 67.66
LV EJECT FRACT MOD 4C 99902: 65.79
LV EJECT FRACT MOD BP 99901: 66.82

## 2019-01-28 PROCEDURE — 93306 TTE W/DOPPLER COMPLETE: CPT | Mod: 26 | Performed by: INTERNAL MEDICINE

## 2019-01-28 PROCEDURE — 93306 TTE W/DOPPLER COMPLETE: CPT

## 2019-03-15 ENCOUNTER — OFFICE VISIT (OUTPATIENT)
Dept: URGENT CARE | Facility: PHYSICIAN GROUP | Age: 44
End: 2019-03-15
Payer: COMMERCIAL

## 2019-03-15 VITALS
HEART RATE: 94 BPM | HEIGHT: 74 IN | RESPIRATION RATE: 14 BRPM | OXYGEN SATURATION: 95 % | DIASTOLIC BLOOD PRESSURE: 84 MMHG | BODY MASS INDEX: 28.23 KG/M2 | TEMPERATURE: 98 F | SYSTOLIC BLOOD PRESSURE: 132 MMHG | WEIGHT: 220 LBS

## 2019-03-15 DIAGNOSIS — J02.8 ACUTE PHARYNGITIS DUE TO OTHER SPECIFIED ORGANISMS: ICD-10-CM

## 2019-03-15 DIAGNOSIS — J02.9 SORE THROAT: ICD-10-CM

## 2019-03-15 LAB
INT CON NEG: NEGATIVE
INT CON POS: POSITIVE
S PYO AG THROAT QL: NORMAL

## 2019-03-15 PROCEDURE — 87880 STREP A ASSAY W/OPTIC: CPT | Performed by: NURSE PRACTITIONER

## 2019-03-15 PROCEDURE — 99213 OFFICE O/P EST LOW 20 MIN: CPT | Performed by: NURSE PRACTITIONER

## 2019-03-15 ASSESSMENT — ENCOUNTER SYMPTOMS
COUGH: 0
EYE DISCHARGE: 0
ORTHOPNEA: 0
MYALGIAS: 0
NAUSEA: 0
FEVER: 0
SORE THROAT: 1
HEADACHES: 0
CHILLS: 0
DIARRHEA: 0

## 2019-03-15 NOTE — PROGRESS NOTES
Subjective:      [place] Go is a 44 y.o. male who presents with Pharyngitis (Onset left side Tues)            HPI New. 44 year old male with sore throat on both sides that started on Tuesday and has progressively worsened. He does have fairly chronic congestion. Denies fever, chills, myalgia or ear pain. He has no nausea or diarrhea. Taking nyquil at nighttime for this issue.  Patient has no known allergies.  Current Outpatient Prescriptions on File Prior to Visit   Medication Sig Dispense Refill   • metoprolol SR (TOPROL XL) 25 MG TABLET SR 24 HR Take 1 Tab by mouth every day. 90 Tab 3   • lisinopril (PRINIVIL) 5 MG Tab Take 1 Tab by mouth every day. 90 Tab 3   • Cholecalciferol (VITAMIN D) 400 UNIT/ML Liquid Take  by mouth.     • cetirizine (ZYRTEC) 10 MG Tab Take 10 mg by mouth every day.     • propranolol (INDERAL) 10 MG Tab Take 1 Tab by mouth at bedtime as needed (palpitations and anxiety). 90 Tab 0     No current facility-administered medications on file prior to visit.      Social History     Social History   • Marital status:      Spouse name: N/A   • Number of children: N/A   • Years of education: N/A     Occupational History   • Not on file.     Social History Main Topics   • Smoking status: Never Smoker   • Smokeless tobacco: Never Used   • Alcohol use 6.0 oz/week     10 Cans of beer per week      Comment: weekly   • Drug use: No   • Sexual activity: Yes     Partners: Female     Birth control/ protection: Surgical      Comment:  22, dtr 17 son 14     Other Topics Concern   • Not on file     Social History Narrative   • No narrative on file     family history includes Alcohol/Drug in his maternal uncle; Cancer in his paternal aunt and paternal grandfather; Diabetes in his maternal uncle; Heart Disease in his father and paternal grandmother; Hyperlipidemia in his mother; Hypertension in his father, maternal grandfather, and mother; Other in his paternal grandmother and sister;  "Stroke in his cousin.      Review of Systems   Constitutional: Negative for chills, fever and malaise/fatigue.   HENT: Positive for congestion and sore throat.    Eyes: Negative for discharge.   Respiratory: Negative for cough.    Cardiovascular: Negative for chest pain and orthopnea.   Gastrointestinal: Negative for diarrhea and nausea.   Musculoskeletal: Negative for myalgias.   Neurological: Negative for headaches.   Endo/Heme/Allergies: Negative for environmental allergies.          Objective:     /84   Pulse 94   Temp 36.7 °C (98 °F)   Resp 14   Ht 1.88 m (6' 2\")   Wt 99.8 kg (220 lb)   SpO2 95%   BMI 28.25 kg/m²      Physical Exam   Constitutional: He is oriented to person, place, and time. He appears well-developed and well-nourished. No distress.   HENT:   Head: Normocephalic and atraumatic.   Right Ear: External ear and ear canal normal. Tympanic membrane is not injected and not perforated. No middle ear effusion.   Left Ear: External ear and ear canal normal. Tympanic membrane is not injected and not perforated.  No middle ear effusion.   Nose: Mucosal edema present.   Mouth/Throat: Posterior oropharyngeal erythema present. No oropharyngeal exudate.   Eyes: Conjunctivae are normal. Right eye exhibits no discharge. Left eye exhibits no discharge.   Neck: Normal range of motion. Neck supple.   Cardiovascular: Normal rate, regular rhythm and normal heart sounds.    No murmur heard.  Pulmonary/Chest: Effort normal and breath sounds normal. No respiratory distress.   Musculoskeletal: Normal range of motion.   Normal movement of all 4 extremities.   Lymphadenopathy:     He has no cervical adenopathy.        Right: No supraclavicular adenopathy present.        Left: No supraclavicular adenopathy present.   Neurological: He is alert and oriented to person, place, and time. Gait normal.   Skin: Skin is warm and dry.   Psychiatric: He has a normal mood and affect. His behavior is normal. Thought content " normal.               Assessment/Plan:     1. Sore throat  POCT Rapid Strep A   2. Acute pharyngitis due to other specified organisms       Differential diagnosis, natural history, supportive care, and indications for immediate follow-up discussed at length.  Strep negative.  Viral illness at this time with no indication for antibiotics. Reviewed with patient expected course of illness and also reviewed OTC medications that may be used for symptom relief. Follow up 7-10 days if not improving.

## 2019-11-04 ENCOUNTER — OFFICE VISIT (OUTPATIENT)
Dept: MEDICAL GROUP | Facility: PHYSICIAN GROUP | Age: 44
End: 2019-11-04
Payer: COMMERCIAL

## 2019-11-04 VITALS
TEMPERATURE: 98.2 F | HEIGHT: 74 IN | SYSTOLIC BLOOD PRESSURE: 118 MMHG | HEART RATE: 71 BPM | OXYGEN SATURATION: 97 % | DIASTOLIC BLOOD PRESSURE: 70 MMHG | BODY MASS INDEX: 30.03 KG/M2 | WEIGHT: 234 LBS

## 2019-11-04 DIAGNOSIS — K21.9 GASTROESOPHAGEAL REFLUX DISEASE WITHOUT ESOPHAGITIS: ICD-10-CM

## 2019-11-04 DIAGNOSIS — R00.2 HEART PALPITATIONS: ICD-10-CM

## 2019-11-04 DIAGNOSIS — E78.2 MIXED HYPERLIPIDEMIA: ICD-10-CM

## 2019-11-04 DIAGNOSIS — Z00.00 ENCOUNTER FOR WELL ADULT EXAM WITHOUT ABNORMAL FINDINGS: ICD-10-CM

## 2019-11-04 DIAGNOSIS — I10 ESSENTIAL HYPERTENSION: ICD-10-CM

## 2019-11-04 DIAGNOSIS — E66.9 OBESITY (BMI 30-39.9): ICD-10-CM

## 2019-11-04 DIAGNOSIS — R79.89 LOW SERUM VITAMIN D: ICD-10-CM

## 2019-11-04 PROCEDURE — 99396 PREV VISIT EST AGE 40-64: CPT | Performed by: NURSE PRACTITIONER

## 2019-11-04 RX ORDER — METOPROLOL SUCCINATE 25 MG/1
25 TABLET, EXTENDED RELEASE ORAL
Qty: 90 TAB | Refills: 3 | Status: SHIPPED | OUTPATIENT
Start: 2019-11-04 | End: 2020-10-22 | Stop reason: SDUPTHER

## 2019-11-04 RX ORDER — LISINOPRIL 5 MG/1
5 TABLET ORAL
Qty: 90 TAB | Refills: 3 | Status: SHIPPED | OUTPATIENT
Start: 2019-11-04 | End: 2020-10-22 | Stop reason: SDUPTHER

## 2019-11-04 ASSESSMENT — PATIENT HEALTH QUESTIONNAIRE - PHQ9: CLINICAL INTERPRETATION OF PHQ2 SCORE: 0

## 2019-11-04 NOTE — ASSESSMENT & PLAN NOTE
Chronic, stable.  Currently taking lisinopril 5 mg/day and metoprolol SR 25 mg/day as directed.   He is not taking baby aspirin daily.   He is not monitoring BP at home.   Denies symptoms low BP: light-headed, tunnel-vision, unusual fatigue, dizziness.  Denies symptoms high BP: pounding headache, visual changes, palpitations, flushed face.   Denies chest pain, shortness of breath, dyspnea on exertion.   Denies medicine side effects: unusual fatigue, slow heartbeat, foot/leg swelling, cough.  Vitals 12/3/2018 1/15/2019 3/15/2019 11/4/2019   SYSTOLIC 116 150 132 118   DIASTOLIC 64 80 84 70   PULSE 71 96 94 71

## 2019-11-04 NOTE — ASSESSMENT & PLAN NOTE
Chronic, stable.    Is taking vitamin d supplement daily.   Due for updated lab work November 2019.   11/15/2016 06:16 11/7/2017 06:18 11/26/2018 06:15   25-Hydroxy   Vitamin D 25 21 (L) 14 (L) 35

## 2019-11-04 NOTE — ASSESSMENT & PLAN NOTE
"This is a new problem to the examiner.  Ongoing problem for the patient.  Patient states that his diet could be better and he is not exercising as much as he should.  Vitals 12/3/2018 1/15/2019 3/15/2019 11/4/2019   WEIGHT 225 226 220 234   HEIGHT 6' 2\" 6' 2\" 6' 2\" 6' 2\"   BMI 28.89 kg/m2 29.02 kg/m2 28.25 kg/m2 30.04 kg/m2     "

## 2019-11-04 NOTE — PROGRESS NOTES
"Subjective:     CC:   Chief Complaint   Patient presents with   • Medication Refill     Blood pressure   • Annual Exam     HPI:   [place] Go is a 44 y.o. male who presents for annual wellness exam. Generally the patient is feeling good. He has no complaints or concerns.    Chronic Medical conditions include:  Essential hypertension  Chronic, stable.  Currently taking lisinopril 5 mg/day and metoprolol SR 25 mg/day as directed.   He is not taking baby aspirin daily.   He is not monitoring BP at home.   Denies symptoms low BP: light-headed, tunnel-vision, unusual fatigue, dizziness.  Denies symptoms high BP: pounding headache, visual changes, palpitations, flushed face.   Denies chest pain, shortness of breath, dyspnea on exertion.   Denies medicine side effects: unusual fatigue, slow heartbeat, foot/leg swelling, cough.  Vitals 12/3/2018 1/15/2019 3/15/2019 11/4/2019   SYSTOLIC 116 150 132 118   DIASTOLIC 64 80 84 70   PULSE 71 96 94 71       Mixed hyperlipidemia  Chronic, uncontrolled.   Not taking any cholesterol lowering medications.  Reports diet is \"okay\".  He is not exercising regularly.  He is not taking ASA daily.  He denies dizziness, claudication, or chest pain.  Due for updated lab work in November 2019.   11/15/2016 06:16 11/7/2017 06:18 11/26/2018 06:15   Cholesterol,Tot 226 (H) 209 (H) 222 (H)   Triglycerides 134 226 (H) 259 (H)   HDL 50 47 43    (H) 117 (H) 127 (H)       Low serum vitamin D  Chronic, stable.    Is taking vitamin d supplement daily.   Due for updated lab work November 2019.   11/15/2016 06:16 11/7/2017 06:18 11/26/2018 06:15   25-Hydroxy   Vitamin D 25 21 (L) 14 (L) 35       Gastroesophageal reflux disease without esophagitis  Chronic problem for the patient that is ongoing.  Patient continues to have acid reflux symptoms about 3-4 times per week requiring over-the-counter Pepcid.  Patient states that he is not aware of any specific foods that trigger his symptoms.  " "States that the symptoms usually come later after he has eaten or when he has not eaten in a while.  Patient states that Pepcid is usually sufficient to resolve symptoms.  Patient continues to sleep with head of bed elevated.    Heart palpitations  Chronic problem for the patient that is improving.  The patient has been evaluated by cardiology and had his medication changed from propanolol to metoprolol SR 25 mg 1 time per day.  Patient states that previously his palpitations were more regular, now he will only noticed them every once in a while and they do not last.  Does not have further follow-up with cardiology unless palpitations worsen.    Obesity (BMI 30-39.9)  This is a new problem to the examiner.  Ongoing problem for the patient.  Patient states that his diet could be better and he is not exercising as much as he should.  Vitals 12/3/2018 1/15/2019 3/15/2019 2019   WEIGHT 225 226 220 234   HEIGHT 6' 2\" 6' 2\" 6' 2\" 6' 2\"   BMI 28.89 kg/m2 29.02 kg/m2 28.25 kg/m2 30.04 kg/m2     Health Maintenance  Advanced directive: NA   Osteoporosis Screen/DEXA: NA   PT/vit D for falls prevention: Takes vitamin D supplement   Cholesterol Screenin2018   Diabetes Screenin2018  AAA Screening: NA   Aspirin Use: NA      Risk Factors:  Ability to perform ADL’s: Able to perform all activities of daily living  Hearing impairment: No hearing impairment.  Fall risks: No increased risk of falls. Uses assistive device: no  Regular exercise: no. Not like I should.   Diet: Not great   Safety Management: Normal safety precautions including seatbelts and smoke detectors.  Substance Abuse: No   Safe in relationship. Yes   Chronic Narcotic Medications: no  Depression screening: using standardized screening tools: No depression  Depression Screen (PHQ-2/PHQ-9) 11/3/2017 2018 2019   PHQ-2 Total Score 0 0 0     Interpretation of PHQ-9 Total Score   Score Severity   1-4 No Depression   5-9 Mild Depression "   10-14 Moderate Depression   15-19 Moderately Severe Depression   20-27 Severe Depression    Cancer screening  Colorectal Cancer Screening: NA    Lung Cancer Screening: NA    Prostate Cancer Screening/PSA: NA     Infectious disease screening/Immunizations  STI Screening: NA   Practices safe sex.  HIV Screening: Declines   Hepatitis C Screening: NA   Immunizations:   Influenza: Declines   HPV:  NA   Tetanus: 11/5/2028   Shingles: NA   Pneumococcal : NA    Other immunizations: NA    He  has a past medical history of Allergy, GERD (gastroesophageal reflux disease), Heart palpitations, Hiatal hernia, and Hypertension.  He  has a past surgical history that includes hydrocelectomy adult; vasectomy (2004); eye surgery (Bilateral, 09/2018); and hernia repair (Right).  Family History   Problem Relation Age of Onset   • Hypertension Mother    • Hyperlipidemia Mother    • Hypertension Father    • Heart Disease Father         Afib, blood clots, cardiac ablation   • Other Sister         cholecystectomy   • Cancer Paternal Aunt         colon ca   • Heart Disease Paternal Grandmother         open heart surgery   • Other Paternal Grandmother         liver cirrhosis   • Cancer Paternal Grandfather    • Stroke Cousin    • Alcohol/Drug Maternal Uncle    • Diabetes Maternal Uncle    • Hypertension Maternal Grandfather      Social History     Tobacco Use   • Smoking status: Never Smoker   • Smokeless tobacco: Never Used   Substance Use Topics   • Alcohol use: Yes     Alcohol/week: 6.0 oz     Types: 10 Cans of beer per week     Comment: weekly   • Drug use: No     Patient Active Problem List    Diagnosis Date Noted   • Obesity (BMI 30-39.9) 11/04/2019   • Gastroesophageal reflux disease without esophagitis 12/03/2018   • Heart palpitations 11/05/2018   • Mixed hyperlipidemia 11/03/2017   • Low serum vitamin D 11/03/2017   • Essential hypertension 11/03/2016     Current Outpatient Medications   Medication Sig Dispense Refill   •  "Cholecalciferol (VITAMIN D-3) 5000 units Tab Take  by mouth.     • lisinopril (PRINIVIL) 5 MG Tab Take 1 Tab by mouth every bedtime. 90 Tab 3   • metoprolol SR (TOPROL XL) 25 MG TABLET SR 24 HR Take 1 Tab by mouth every bedtime. 90 Tab 3   • cetirizine (ZYRTEC) 10 MG Tab Take 10 mg by mouth every day.       No current facility-administered medications for this visit.     (including changes today)  Allergies: Patient has no known allergies.    Review of Systems   Constitutional:  Negative for fever, chills and malaise/fatigue.   HENT:  Negative for congestion.    Eyes:  Negative for pain.   Respiratory:  Negative for cough and shortness of breath.    Cardiovascular:  Negative for leg swelling.   Gastrointestinal:  Negative for nausea, vomiting, abdominal pain and diarrhea.   Genitourinary:  Negative for dysuria and hematuria.   Skin:  Negative for rash.   Neurological:  Negative for dizziness, focal weakness and headaches.   Endo/Heme/Allergies:  Does not bruise/bleed easily.   Psychiatric/Behavioral:  Negative for depression.  The patient is not nervous/anxious.      Objective:     /70 (BP Location: Left arm, Patient Position: Sitting, BP Cuff Size: Large adult)   Pulse 71   Temp 36.8 °C (98.2 °F) (Temporal)   Ht 1.88 m (6' 2\")   Wt 106.1 kg (234 lb)   SpO2 97%   BMI 30.04 kg/m²   Body mass index is 30.04 kg/m².  Wt Readings from Last 4 Encounters:   11/04/19 106.1 kg (234 lb)   03/15/19 99.8 kg (220 lb)   01/15/19 102.5 kg (226 lb)   12/03/18 102.1 kg (225 lb)     Physical Exam:  Constitutional:  Well-developed and well-nourished. Not diaphoretic. No distress.   Skin:  Skin is warm and dry. No rash noted.  Head:  Atraumatic without lesions.  Eyes:  Conjunctivae and extraocular motions are normal. Pupils are equal, round, and reactive to light. No scleral icterus.   Ears:  External ears unremarkable. Tympanic membranes clear and intact.  Nose:  Nares patent. Septum midline. Turbinates without erythema " nor edema. No discharge.   Mouth/Throat:  Dentition is good. Tongue normal. Oropharynx is clear and moist. Posterior pharynx without erythema or exudates.  Neck:  Supple, trachea midline. Normal range of motion. No thyromegaly present. No lymphadenopathy--cervical or supraclavicular.  Cardiovascular:  Regular rate and rhythm, S1 and S2 without murmur, rubs, or gallops.    Chest:  Effort normal. Clear to auscultation throughout. No adventitious sounds. No CVA tenderness.  Abdomen:  Soft, non tender, and without distention. Active bowel sounds in all four quadrants. No rebound, guarding, masses or HSM.  Extremities:  No cyanosis, clubbing, erythema, nor edema. Distal pulses intact and symmetric.   Musculoskeletal:  All major joints AROM full in all directions without pain.  Neurological:  Alert and oriented x 3. No cranial nerve deficit. Sensation intact.  Psychiatric:  Behavior, mood, and affect are appropriate.    A chaperone was offered to the patient during today's exam. Patient declined chaperone.    Assessment and Plan:     1. Encounter for well adult exam without abnormal findings    2. Essential hypertension  Continue lisinopril 5 mg/day and metoprolol SR 25 mg/day, refills provided.  - Comp Metabolic Panel; Future  - MICROALBUMIN CREAT RATIO URINE; Future  - lisinopril (PRINIVIL) 5 MG Tab; Take 1 Tab by mouth every bedtime.  Dispense: 90 Tab; Refill: 3  - metoprolol SR (TOPROL XL) 25 MG TABLET SR 24 HR; Take 1 Tab by mouth every bedtime.  Dispense: 90 Tab; Refill: 3    3. Heart palpitations  Continue metoprolol SR 25 mg/day, refill provided.  Due for updated labs.  Plan to follow-up with cardiology only as needed.  - TSH WITH REFLEX TO FT4; Future  - metoprolol SR (TOPROL XL) 25 MG TABLET SR 24 HR; Take 1 Tab by mouth every bedtime.  Dispense: 90 Tab; Refill: 3    4. Mixed hyperlipidemia  Chronic, ongoing.  Due for updated lipid profile.  - Lipid Profile; Future    5. Obesity (BMI 30-39.9)  Discussed healthy  diet, exercise, weight loss.  Patient's body mass index is 30.04 kg/m². Exercise and nutrition counseling were performed at this visit.  - Patient identified as having weight management issue.  Appropriate orders and counseling given.    6. Low serum vitamin D  Continue over-the-counter vitamin D supplement.  - VITAMIN D,25 HYDROXY; Future    7. Gastroesophageal reflux disease without esophagitis  Continue over-the-counter Pepcid as needed.    HCM: Updated  Labs per orders.   Vaccinations per orders. Declines    Patient Counseling:  Health care Screening recommendations are reviewed with the patient today.  Discussion today about general wellness and lifestyle habits to include but not limited to: Fall prevention, Engaging in regular exercise, Quitting smoking if indicated, Weight loss if indicated/ Healthy diet, Skin care.  Discussed moderation in sodium/caffeine intake, saturated fat and cholesterol, caloric balance, sufficient fresh fruits/vegetables, fiber, iron.  Discussed brushing, flossing, and dental visits.   Discussed tobacco, alcohol, or other drug use; availability of treatment for abuse.   Discussed sexually transmitted infections, partner selection, use of condoms.  Injury prevention: Discussed safety belts, safety helmets, smoke detector, etc.  Please follow all instructions that were discussed at your visit.   Please take all medications as instructed. If you have any adverse reactions, questions, or concerns please contact me immediately or seek emergency medical attention.  Please follow up with any established specialists or with new referrals that were made today.    Follow-up: Return in about 1 year (around 11/4/2020) for Preventative Annual, As needed.

## 2019-11-04 NOTE — ASSESSMENT & PLAN NOTE
"Chronic, uncontrolled.   Not taking any cholesterol lowering medications.  Reports diet is \"okay\".  He is not exercising regularly.  He is not taking ASA daily.  He denies dizziness, claudication, or chest pain.  Due for updated lab work in November 2019.   11/15/2016 06:16 11/7/2017 06:18 11/26/2018 06:15   Cholesterol,Tot 226 (H) 209 (H) 222 (H)   Triglycerides 134 226 (H) 259 (H)   HDL 50 47 43    (H) 117 (H) 127 (H)     "

## 2019-11-04 NOTE — ASSESSMENT & PLAN NOTE
Chronic problem for the patient that is improving.  The patient has been evaluated by cardiology and had his medication changed from propanolol to metoprolol SR 25 mg 1 time per day.  Patient states that previously his palpitations were more regular, now he will only noticed them every once in a while and they do not last.  Does not have further follow-up with cardiology unless palpitations worsen.

## 2019-11-04 NOTE — ASSESSMENT & PLAN NOTE
Chronic problem for the patient that is ongoing.  Patient continues to have acid reflux symptoms about 3-4 times per week requiring over-the-counter Pepcid.  Patient states that he is not aware of any specific foods that trigger his symptoms.  States that the symptoms usually come later after he has eaten or when he has not eaten in a while.  Patient states that Pepcid is usually sufficient to resolve symptoms.  Patient continues to sleep with head of bed elevated.

## 2019-11-13 ENCOUNTER — HOSPITAL ENCOUNTER (OUTPATIENT)
Dept: LAB | Facility: MEDICAL CENTER | Age: 44
End: 2019-11-13
Attending: NURSE PRACTITIONER
Payer: COMMERCIAL

## 2019-11-13 DIAGNOSIS — E78.2 MIXED HYPERLIPIDEMIA: ICD-10-CM

## 2019-11-13 DIAGNOSIS — R00.2 HEART PALPITATIONS: ICD-10-CM

## 2019-11-13 DIAGNOSIS — I10 ESSENTIAL HYPERTENSION: ICD-10-CM

## 2019-11-13 DIAGNOSIS — R79.89 LOW SERUM VITAMIN D: ICD-10-CM

## 2019-11-13 LAB
25(OH)D3 SERPL-MCNC: 42 NG/ML (ref 30–100)
ALBUMIN SERPL BCP-MCNC: 4.3 G/DL (ref 3.2–4.9)
ALBUMIN/GLOB SERPL: 1.7 G/DL
ALP SERPL-CCNC: 38 U/L (ref 30–99)
ALT SERPL-CCNC: 34 U/L (ref 2–50)
ANION GAP SERPL CALC-SCNC: 8 MMOL/L (ref 0–11.9)
AST SERPL-CCNC: 21 U/L (ref 12–45)
BILIRUB SERPL-MCNC: 0.7 MG/DL (ref 0.1–1.5)
BUN SERPL-MCNC: 19 MG/DL (ref 8–22)
CALCIUM SERPL-MCNC: 9.3 MG/DL (ref 8.5–10.5)
CHLORIDE SERPL-SCNC: 104 MMOL/L (ref 96–112)
CHOLEST SERPL-MCNC: 246 MG/DL (ref 100–199)
CO2 SERPL-SCNC: 28 MMOL/L (ref 20–33)
CREAT SERPL-MCNC: 1.03 MG/DL (ref 0.5–1.4)
CREAT UR-MCNC: 213.7 MG/DL
FASTING STATUS PATIENT QL REPORTED: NORMAL
GLOBULIN SER CALC-MCNC: 2.6 G/DL (ref 1.9–3.5)
GLUCOSE SERPL-MCNC: 94 MG/DL (ref 65–99)
HDLC SERPL-MCNC: 45 MG/DL
LDLC SERPL CALC-MCNC: 164 MG/DL
MICROALBUMIN UR-MCNC: <0.7 MG/DL
MICROALBUMIN/CREAT UR: NORMAL MG/G (ref 0–30)
POTASSIUM SERPL-SCNC: 4.3 MMOL/L (ref 3.6–5.5)
PROT SERPL-MCNC: 6.9 G/DL (ref 6–8.2)
SODIUM SERPL-SCNC: 140 MMOL/L (ref 135–145)
TRIGL SERPL-MCNC: 185 MG/DL (ref 0–149)
TSH SERPL DL<=0.005 MIU/L-ACNC: 2.45 UIU/ML (ref 0.38–5.33)

## 2019-11-13 PROCEDURE — 82306 VITAMIN D 25 HYDROXY: CPT

## 2019-11-13 PROCEDURE — 82043 UR ALBUMIN QUANTITATIVE: CPT

## 2019-11-13 PROCEDURE — 80061 LIPID PANEL: CPT

## 2019-11-13 PROCEDURE — 36415 COLL VENOUS BLD VENIPUNCTURE: CPT

## 2019-11-13 PROCEDURE — 80053 COMPREHEN METABOLIC PANEL: CPT

## 2019-11-13 PROCEDURE — 82570 ASSAY OF URINE CREATININE: CPT

## 2019-11-13 PROCEDURE — 84443 ASSAY THYROID STIM HORMONE: CPT

## 2020-10-22 ENCOUNTER — OFFICE VISIT (OUTPATIENT)
Dept: MEDICAL GROUP | Facility: PHYSICIAN GROUP | Age: 45
End: 2020-10-22
Payer: COMMERCIAL

## 2020-10-22 VITALS
HEART RATE: 91 BPM | TEMPERATURE: 97.4 F | BODY MASS INDEX: 29.52 KG/M2 | OXYGEN SATURATION: 96 % | SYSTOLIC BLOOD PRESSURE: 122 MMHG | HEIGHT: 74 IN | DIASTOLIC BLOOD PRESSURE: 74 MMHG | WEIGHT: 230 LBS

## 2020-10-22 DIAGNOSIS — R79.89 LOW SERUM VITAMIN D: ICD-10-CM

## 2020-10-22 DIAGNOSIS — E78.2 MIXED HYPERLIPIDEMIA: ICD-10-CM

## 2020-10-22 DIAGNOSIS — Z91.09 ENVIRONMENTAL ALLERGIES: ICD-10-CM

## 2020-10-22 DIAGNOSIS — I10 ESSENTIAL HYPERTENSION: ICD-10-CM

## 2020-10-22 DIAGNOSIS — R00.2 PALPITATIONS: ICD-10-CM

## 2020-10-22 DIAGNOSIS — E66.3 OVERWEIGHT (BMI 25.0-29.9): ICD-10-CM

## 2020-10-22 DIAGNOSIS — Z00.00 ENCOUNTER FOR WELL ADULT EXAM WITHOUT ABNORMAL FINDINGS: ICD-10-CM

## 2020-10-22 PROCEDURE — 99396 PREV VISIT EST AGE 40-64: CPT | Performed by: NURSE PRACTITIONER

## 2020-10-22 RX ORDER — CHLORAL HYDRATE 500 MG
1000 CAPSULE ORAL DAILY
COMMUNITY

## 2020-10-22 RX ORDER — FLUTICASONE PROPIONATE 50 MCG
1 SPRAY, SUSPENSION (ML) NASAL DAILY
Qty: 16 G | Refills: 11 | Status: SHIPPED | OUTPATIENT
Start: 2020-10-22 | End: 2021-11-10 | Stop reason: SDUPTHER

## 2020-10-22 RX ORDER — LISINOPRIL 5 MG/1
5 TABLET ORAL
Qty: 90 TAB | Refills: 3 | Status: SHIPPED | OUTPATIENT
Start: 2020-10-22 | End: 2021-11-04 | Stop reason: SDUPTHER

## 2020-10-22 RX ORDER — METOPROLOL SUCCINATE 25 MG/1
25 TABLET, EXTENDED RELEASE ORAL
Qty: 90 TAB | Refills: 3 | Status: SHIPPED | OUTPATIENT
Start: 2020-10-22 | End: 2021-11-10 | Stop reason: SDUPTHER

## 2020-10-22 ASSESSMENT — PATIENT HEALTH QUESTIONNAIRE - PHQ9: CLINICAL INTERPRETATION OF PHQ2 SCORE: 0

## 2020-10-22 NOTE — PROGRESS NOTES
"Subjective:     CC:   Chief Complaint   Patient presents with   • Medication Refill     Annual refill     HPI:   [place] Go is a 45 y.o. male who presents for annual exam    Essential hypertension  Chronic, stable.    Currently taking lisinopril 5 mg/HS and metoprolol SR 25 mg/HS as directed.   Reports diet \"could be better\".   He is not following a low-salt diet.  He is not exercising regularly.  He is not taking baby aspirin daily.   He is monitoring BP at home, occasionally - consistent with today's reading.  Denies symptoms low BP: light-headed, tunnel-vision, unusual fatigue, dizziness.  Denies symptoms high BP: pounding headache, visual changes, palpitations, flushed face.   Denies chest pain, shortness of breath, dyspnea on exertion.   Denies medicine side effects: unusual fatigue, slow heartbeat, foot/leg swelling, cough.   Vitals 1/15/2019 3/15/2019 11/4/2019 10/22/2020   SYSTOLIC 150 132 118 122   DIASTOLIC 80 84 70 74   PULSE 96 94 71 91       Overweight (BMI 25.0-29.9)  Chronic, stable.   Weight change: 4 lb loss  Diet: \"could be better\"  Exercise: occasionally   Vitals 1/15/2019 3/15/2019 11/4/2019 10/22/2020   WEIGHT 226 220 234 230   HEIGHT 6' 2\" 6' 2\" 6' 2\" 6' 2\"   BMI 29.02 kg/m2 28.25 kg/m2 30.04 kg/m2 29.53 kg/m2       Mixed hyperlipidemia  Chronic, uncontrolled. Not taking statin medication. Continues OTC fish oil daily. Reports that his diet could be better. He was walking and exercising regularly, but has slacked. Due for updated labs in November 2020.   11/7/2017 06:18 11/26/2018 06:15 11/13/2019 06:33   Cholesterol,Tot 209 (H) 222 (H) 246 (H)   Triglycerides 226 (H) 259 (H) 185 (H)   HDL 47 43 45    (H) 127 (H) 164 (H)       Low serum vitamin D  Chronic, stable.  Denies fatigue.  Denies any muscle weakness.  No history of CKD.  Reports having a good diet, including dairy products.  No history of IBD's, celiac, CF, or surgeries causing malabsorption.  Patient is not " "obese.  Is taking vitamin d supplement 5000 iu daily.   Due for updated lab work in November 2020.    11/15/2016 06:16 11/7/2017 06:18 11/26/2018 06:15 11/13/2019 06:33   25-Hydroxy   Vitamin D 25 21 (L) 14 (L) 35 42       Heart palpitations  Chronic, stable. Continues metoprolol SR 25 mg/HS. Reports that he has rare palpitations and has not experienced a racing heart during the night in quite a while. Denies any side effects of the medications. Previously followed by cardiology, has been released.    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 Reports that he has environmental allergies year round, improves slightly during the winter. States he is allergic to cats and he has a cat. Reports symptoms of PND and cough. Continues OTC zyrtec which improves symptoms, but does not resolve them.     Last Colorectal Cancer Screening: NA  Last Tdap: 2018  Received HPV series: Aged out  Hx STDs: No    Exercise: sporadic irregular exercise, <half hour walking weekly  Diet: \"could be better\"      He  has a past medical history of Allergy, GERD (gastroesophageal reflux disease), Heart palpitations, Hiatal hernia, and Hypertension.  He  has a past surgical history that includes hydrocelectomy adult; vasectomy (2004); eye surgery (Bilateral, 09/2018); and hernia repair (Right).    Family History   Problem Relation Age of Onset   • Hypertension Mother    • Hyperlipidemia Mother    • Hypertension Father    • Heart Disease Father         Afib, blood clots, cardiac ablation   • Other Sister         cholecystectomy   • Cancer Paternal Aunt         colon ca   • Heart Disease Paternal Grandmother         open heart surgery   • Other Paternal Grandmother         liver cirrhosis   • Cancer Paternal Grandfather    • Stroke Cousin    • Alcohol/Drug Maternal Uncle    • Diabetes Maternal Uncle    • Hypertension Maternal Grandfather      Social History     Tobacco Use   • Smoking status: Never Smoker   • Smokeless tobacco: " Never Used   Substance Use Topics   • Alcohol use: Yes     Alcohol/week: 6.0 oz     Types: 10 Cans of beer per week     Comment: weekly   • Drug use: No     He  reports being sexually active and has had partner(s) who are Female. He reports using the following method of birth control/protection: Surgical.    Patient Active Problem List    Diagnosis Date Noted   • Environmental allergies 10/22/2020   • Overweight (BMI 25.0-29.9) 11/04/2019   • Gastroesophageal reflux disease without esophagitis 12/03/2018   • Heart palpitations 11/05/2018   • Mixed hyperlipidemia 11/03/2017   • Low serum vitamin D 11/03/2017   • Essential hypertension 11/03/2016     Current Outpatient Medications   Medication Sig Dispense Refill   • metoprolol SR (TOPROL XL) 25 MG TABLET SR 24 HR Take 1 Tab by mouth every bedtime. 90 Tab 3   • lisinopril (PRINIVIL) 5 MG Tab Take 1 Tab by mouth every bedtime. 90 Tab 3   • Omega-3 Fatty Acids (FISH OIL) 1000 MG Cap capsule Take 1,000 mg by mouth every day.     • fluticasone (FLONASE) 50 MCG/ACT nasal spray Spray 1 Spray in nose every day. 16 g 11   • Cholecalciferol (VITAMIN D-3) 5000 units Tab Take  by mouth.     • cetirizine (ZYRTEC) 10 MG Tab Take 10 mg by mouth every day.       No current facility-administered medications for this visit.      No Known Allergies    Review of Systems   Constitutional: Negative for fever, chills and malaise/fatigue.   HENT: + Environmental allergies, postnasal drainage, cough. Negative for congestion.    Eyes: Negative for pain.   Respiratory: Negative for cough and shortness of breath.    Cardiovascular: + Rare heart palpitations.  Negative for chest pain and leg swelling.   Gastrointestinal: Negative for nausea, vomiting, abdominal pain and diarrhea.   Genitourinary: Negative for dysuria and hematuria.   Skin: Negative for rash.   Neurological: Negative for dizziness, focal weakness and headaches.   Endo/Heme/Allergies: + Environmental allergies.  Does not  "bruise/bleed easily.   Psychiatric/Behavioral: Negative for depression.  The patient is not nervous/anxious.      Objective:   /74 (BP Location: Left arm, Patient Position: Sitting, BP Cuff Size: Adult long)   Pulse 91   Temp 36.3 °C (97.4 °F) (Temporal)   Ht 1.88 m (6' 2\")   Wt 104.3 kg (230 lb)   SpO2 96%   BMI 29.53 kg/m²      Wt Readings from Last 4 Encounters:   10/22/20 104.3 kg (230 lb)   11/04/19 106.1 kg (234 lb)   03/15/19 99.8 kg (220 lb)   01/15/19 102.5 kg (226 lb)     Physical Exam:  Constitutional: Well-developed and well-nourished. Not diaphoretic. No distress.   Skin: Skin is warm and dry. No rash noted.  Head: Atraumatic without lesions.  Eyes: Conjunctivae and extraocular motions are normal. Pupils are equal, round, and reactive to light. No scleral icterus.   Ears:  External ears unremarkable. Tympanic membranes clear and intact.  Nose: Nares patent. Septum midline. Turbinates without erythema nor edema. No discharge.   Mouth/Throat: Tongue normal. Oropharynx is clear and moist. Posterior pharynx without erythema or exudates.  Neck: Supple, trachea midline. Normal range of motion. No thyromegaly present. No lymphadenopathy--cervical or supraclavicular.  Cardiovascular: Regular rate and rhythm, S1 and S2 without murmur, rubs, or gallops.    Abdomen: Soft, non tender, and without distention. Active bowel sounds in all four quadrants. No rebound, guarding.  Extremities: No cyanosis, clubbing, erythema, nor edema. Distal pulses intact and symmetric.   Musculoskeletal: All major joints AROM full in all directions without pain.  Neurological: Alert and oriented x 3. Grossly non-focal. Strength and sensation grossly intact.  Psychiatric:  Behavior, mood, and affect are appropriate.    Assessment and Plan:   1. Encounter for well adult exam without abnormal findings    2. Essential hypertension  3. Heart palpitations  Chronic, stable.  Continue lisinopril 5 mg/day and metoprolol SR 25 mg/day, " refills provided.  Due for updated labs.  - Comp Metabolic Panel; Future  - MICROALBUMIN CREAT RATIO URINE; Future  - TSH WITH REFLEX TO FT4; Future  - metoprolol SR (TOPROL XL) 25 MG TABLET SR 24 HR; Take 1 Tab by mouth every bedtime.  Dispense: 90 Tab; Refill: 3  - lisinopril (PRINIVIL) 5 MG Tab; Take 1 Tab by mouth every bedtime.  Dispense: 90 Tab; Refill: 3    4. Mixed hyperlipidemia  Chronic, uncontrolled.  Not on medication for this issue.    Encouraged diet high in fruits, vegetables, and fiber. And a diet low in salt, refined carbohydrates, cholesterol, saturated fat, and trans fatty acids.    Encouraged a minimum of 30 minutes of moderate intensity aerobic exercise (eg, brisk walking) is recommended on five days each week. Or 30 minutes of vigorous-intensity aerobic exercise (eg, jogging) on three days each week.   Due for updated lab work.  Will message results through [employer].  - Comp Metabolic Panel; Future  - Lipid Profile; Future    5. Overweight (BMI 25.0-29.9)  Chronic, ongoing.  Encouraged healthy diet, exercise, weight loss.  Due for updated annual labs.  - CBC WITH DIFFERENTIAL; Future  - Comp Metabolic Panel; Future  - Lipid Profile; Future  - TSH WITH REFLEX TO FT4; Future    6. Environmental allergies  Chronic, ongoing.  Continue over-the-counter Zyrtec daily.  Plan to start fluticasone nasal spray daily.  - fluticasone (FLONASE) 50 MCG/ACT nasal spray; Spray 1 Spray in nose every day.  Dispense: 16 g; Refill: 11    7. Low serum vitamin D  Chronic, stable.  Continue over-the-counter vitamin D supplement daily.  Due for updated labs.  - VITAMIN D,25 HYDROXY; Future     Health maintenance: Completed   Labs per orders  Immunizations: patient will get flu shot through work  Patient counseled about skin care, diet, supplements, and exercise.  Discussed diet and exercise, colorectal cancer screening and adequate intake of calcium and vitamin D.     Follow-up: Return in about 1 year (around  10/22/2021) for Preventative Annual, Medication Refill, Follow up Labs, As needed.

## 2020-10-22 NOTE — ASSESSMENT & PLAN NOTE
"Chronic, stable.    Currently taking lisinopril 5 mg/HS and metoprolol SR 25 mg/HS as directed.   Reports diet \"could be better\".   He is not following a low-salt diet.  He is not exercising regularly.  He is not taking baby aspirin daily.   He is monitoring BP at home, occasionally - consistent with today's reading.  Denies symptoms low BP: light-headed, tunnel-vision, unusual fatigue, dizziness.  Denies symptoms high BP: pounding headache, visual changes, palpitations, flushed face.   Denies chest pain, shortness of breath, dyspnea on exertion.   Denies medicine side effects: unusual fatigue, slow heartbeat, foot/leg swelling, cough.   Vitals 1/15/2019 3/15/2019 11/4/2019 10/22/2020   SYSTOLIC 150 132 118 122   DIASTOLIC 80 84 70 74   PULSE 96 94 71 91     "

## 2020-10-22 NOTE — ASSESSMENT & PLAN NOTE
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 Reports that he has environmental allergies year round, improves slightly during the winter. States he is allergic to cats and he has a cat. Reports symptoms of PND and cough. Continues OTC zyrtec which improves symptoms, but does not resolve them.

## 2020-10-22 NOTE — ASSESSMENT & PLAN NOTE
"Chronic, stable.   Weight change: 4 lb loss  Diet: \"could be better\"  Exercise: occasionally   Vitals 1/15/2019 3/15/2019 11/4/2019 10/22/2020   WEIGHT 226 220 234 230   HEIGHT 6' 2\" 6' 2\" 6' 2\" 6' 2\"   BMI 29.02 kg/m2 28.25 kg/m2 30.04 kg/m2 29.53 kg/m2     "

## 2020-10-22 NOTE — ASSESSMENT & PLAN NOTE
Chronic, uncontrolled. Not taking statin medication. Continues OTC fish oil daily. Reports that his diet could be better. He was walking and exercising regularly, but has slacked. Due for updated labs in November 2020.   11/7/2017 06:18 11/26/2018 06:15 11/13/2019 06:33   Cholesterol,Tot 209 (H) 222 (H) 246 (H)   Triglycerides 226 (H) 259 (H) 185 (H)   HDL 47 43 45    (H) 127 (H) 164 (H)

## 2020-10-22 NOTE — ASSESSMENT & PLAN NOTE
Chronic, stable.  Denies fatigue.  Denies any muscle weakness.  No history of CKD.  Reports having a good diet, including dairy products.  No history of IBD's, celiac, CF, or surgeries causing malabsorption.  Patient is not obese.  Is taking vitamin d supplement 5000 iu daily.   Due for updated lab work in November 2020.    11/15/2016 06:16 11/7/2017 06:18 11/26/2018 06:15 11/13/2019 06:33   25-Hydroxy   Vitamin D 25 21 (L) 14 (L) 35 42

## 2020-11-05 ENCOUNTER — HOSPITAL ENCOUNTER (OUTPATIENT)
Dept: LAB | Facility: MEDICAL CENTER | Age: 45
End: 2020-11-05
Attending: NURSE PRACTITIONER
Payer: COMMERCIAL

## 2020-11-05 DIAGNOSIS — I10 ESSENTIAL HYPERTENSION: ICD-10-CM

## 2020-11-05 DIAGNOSIS — E78.2 MIXED HYPERLIPIDEMIA: ICD-10-CM

## 2020-11-05 DIAGNOSIS — R79.89 LOW SERUM VITAMIN D: ICD-10-CM

## 2020-11-05 DIAGNOSIS — E66.3 OVERWEIGHT (BMI 25.0-29.9): ICD-10-CM

## 2020-11-05 LAB
25(OH)D3 SERPL-MCNC: 58 NG/ML (ref 30–100)
ALBUMIN SERPL BCP-MCNC: 4.4 G/DL (ref 3.2–4.9)
ALBUMIN/GLOB SERPL: 1.7 G/DL
ALP SERPL-CCNC: 49 U/L (ref 30–99)
ALT SERPL-CCNC: 57 U/L (ref 2–50)
ANION GAP SERPL CALC-SCNC: 12 MMOL/L (ref 7–16)
AST SERPL-CCNC: 36 U/L (ref 12–45)
BASOPHILS # BLD AUTO: 0.8 % (ref 0–1.8)
BASOPHILS # BLD: 0.05 K/UL (ref 0–0.12)
BILIRUB SERPL-MCNC: 0.4 MG/DL (ref 0.1–1.5)
BUN SERPL-MCNC: 19 MG/DL (ref 8–22)
CALCIUM SERPL-MCNC: 9.2 MG/DL (ref 8.5–10.5)
CHLORIDE SERPL-SCNC: 102 MMOL/L (ref 96–112)
CHOLEST SERPL-MCNC: 238 MG/DL (ref 100–199)
CO2 SERPL-SCNC: 25 MMOL/L (ref 20–33)
CREAT SERPL-MCNC: 1.32 MG/DL (ref 0.5–1.4)
CREAT UR-MCNC: 261.13 MG/DL
EOSINOPHIL # BLD AUTO: 0.16 K/UL (ref 0–0.51)
EOSINOPHIL NFR BLD: 2.5 % (ref 0–6.9)
ERYTHROCYTE [DISTWIDTH] IN BLOOD BY AUTOMATED COUNT: 39.8 FL (ref 35.9–50)
FASTING STATUS PATIENT QL REPORTED: NORMAL
GLOBULIN SER CALC-MCNC: 2.6 G/DL (ref 1.9–3.5)
GLUCOSE SERPL-MCNC: 92 MG/DL (ref 65–99)
HCT VFR BLD AUTO: 51 % (ref 42–52)
HDLC SERPL-MCNC: 44 MG/DL
HGB BLD-MCNC: 16.5 G/DL (ref 14–18)
IMM GRANULOCYTES # BLD AUTO: 0.02 K/UL (ref 0–0.11)
IMM GRANULOCYTES NFR BLD AUTO: 0.3 % (ref 0–0.9)
LDLC SERPL CALC-MCNC: 148 MG/DL
LYMPHOCYTES # BLD AUTO: 2.22 K/UL (ref 1–4.8)
LYMPHOCYTES NFR BLD: 34.7 % (ref 22–41)
MCH RBC QN AUTO: 29.1 PG (ref 27–33)
MCHC RBC AUTO-ENTMCNC: 32.4 G/DL (ref 33.7–35.3)
MCV RBC AUTO: 89.9 FL (ref 81.4–97.8)
MICROALBUMIN UR-MCNC: <1.2 MG/DL
MICROALBUMIN/CREAT UR: NORMAL MG/G (ref 0–30)
MONOCYTES # BLD AUTO: 0.77 K/UL (ref 0–0.85)
MONOCYTES NFR BLD AUTO: 12 % (ref 0–13.4)
NEUTROPHILS # BLD AUTO: 3.18 K/UL (ref 1.82–7.42)
NEUTROPHILS NFR BLD: 49.7 % (ref 44–72)
NRBC # BLD AUTO: 0 K/UL
NRBC BLD-RTO: 0 /100 WBC
PLATELET # BLD AUTO: 241 K/UL (ref 164–446)
PMV BLD AUTO: 11 FL (ref 9–12.9)
POTASSIUM SERPL-SCNC: 4.1 MMOL/L (ref 3.6–5.5)
PROT SERPL-MCNC: 7 G/DL (ref 6–8.2)
RBC # BLD AUTO: 5.67 M/UL (ref 4.7–6.1)
SODIUM SERPL-SCNC: 139 MMOL/L (ref 135–145)
TRIGL SERPL-MCNC: 230 MG/DL (ref 0–149)
TSH SERPL DL<=0.005 MIU/L-ACNC: 3.26 UIU/ML (ref 0.38–5.33)
WBC # BLD AUTO: 6.4 K/UL (ref 4.8–10.8)

## 2020-11-05 PROCEDURE — 85025 COMPLETE CBC W/AUTO DIFF WBC: CPT

## 2020-11-05 PROCEDURE — 82043 UR ALBUMIN QUANTITATIVE: CPT

## 2020-11-05 PROCEDURE — 84443 ASSAY THYROID STIM HORMONE: CPT

## 2020-11-05 PROCEDURE — 82570 ASSAY OF URINE CREATININE: CPT

## 2020-11-05 PROCEDURE — 36415 COLL VENOUS BLD VENIPUNCTURE: CPT

## 2020-11-05 PROCEDURE — 80061 LIPID PANEL: CPT

## 2020-11-05 PROCEDURE — 80053 COMPREHEN METABOLIC PANEL: CPT

## 2020-11-05 PROCEDURE — 82306 VITAMIN D 25 HYDROXY: CPT

## 2020-11-08 DIAGNOSIS — I10 ESSENTIAL HYPERTENSION: ICD-10-CM

## 2020-11-08 DIAGNOSIS — Z00.00 ROUTINE HEALTH MAINTENANCE: ICD-10-CM

## 2020-11-08 DIAGNOSIS — R79.89 LOW SERUM VITAMIN D: ICD-10-CM

## 2020-11-08 DIAGNOSIS — R00.2 PALPITATIONS: ICD-10-CM

## 2020-11-08 DIAGNOSIS — E66.3 OVERWEIGHT (BMI 25.0-29.9): ICD-10-CM

## 2020-11-08 DIAGNOSIS — E78.2 MIXED HYPERLIPIDEMIA: ICD-10-CM

## 2020-11-09 NOTE — PROGRESS NOTES
1. Mixed hyperlipidemia  - Comp Metabolic Panel; Future  - Lipid Profile; Future    2. Overweight (BMI 25.0-29.9)  - CBC WITH DIFFERENTIAL; Future  - Comp Metabolic Panel; Future  - Lipid Profile; Future  - TSH WITH REFLEX TO FT4; Future    3. Low serum vitamin D  - VITAMIN D,25 HYDROXY; Future    4. Heart palpitations  - TSH WITH REFLEX TO FT4; Future    5. Essential hypertension  - Comp Metabolic Panel; Future  - MICROALBUMIN CREAT RATIO URINE; Future  - TSH WITH REFLEX TO FT4; Future    6. Routine health maintenance  - CBC WITH DIFFERENTIAL; Future  - Comp Metabolic Panel; Future  - Lipid Profile; Future  - MICROALBUMIN CREAT RATIO URINE; Future  - TSH WITH REFLEX TO FT4; Future  - VITAMIN D,25 HYDROXY; Future

## 2020-11-18 ENCOUNTER — HOSPITAL ENCOUNTER (OUTPATIENT)
Dept: LAB | Facility: MEDICAL CENTER | Age: 45
End: 2020-11-18
Attending: PHYSICIAN ASSISTANT
Payer: COMMERCIAL

## 2020-11-18 ENCOUNTER — TELEMEDICINE (OUTPATIENT)
Dept: TELEHEALTH | Facility: TELEMEDICINE | Age: 45
End: 2020-11-18
Payer: COMMERCIAL

## 2020-11-18 VITALS — BODY MASS INDEX: 28.88 KG/M2 | WEIGHT: 225 LBS | TEMPERATURE: 98.4 F | HEIGHT: 74 IN

## 2020-11-18 DIAGNOSIS — Z20.822 EXPOSURE TO COVID-19 VIRUS: ICD-10-CM

## 2020-11-18 DIAGNOSIS — R19.7 DIARRHEA, UNSPECIFIED TYPE: ICD-10-CM

## 2020-11-18 DIAGNOSIS — R43.2 TASTE ABSENT: ICD-10-CM

## 2020-11-18 DIAGNOSIS — R43.1 ABNORMAL SMELL: ICD-10-CM

## 2020-11-18 DIAGNOSIS — Z20.822 SUSPECTED COVID-19 VIRUS INFECTION: ICD-10-CM

## 2020-11-18 PROCEDURE — U0003 INFECTIOUS AGENT DETECTION BY NUCLEIC ACID (DNA OR RNA); SEVERE ACUTE RESPIRATORY SYNDROME CORONAVIRUS 2 (SARS-COV-2) (CORONAVIRUS DISEASE [COVID-19]), AMPLIFIED PROBE TECHNIQUE, MAKING USE OF HIGH THROUGHPUT TECHNOLOGIES AS DESCRIBED BY CMS-2020-01-R: HCPCS

## 2020-11-18 PROCEDURE — 99214 OFFICE O/P EST MOD 30 MIN: CPT | Mod: 95,CR,CS | Performed by: PHYSICIAN ASSISTANT

## 2020-11-18 PROCEDURE — C9803 HOPD COVID-19 SPEC COLLECT: HCPCS

## 2020-11-18 RX ORDER — AMOXICILLIN AND CLAVULANATE POTASSIUM 875; 125 MG/1; MG/1
TABLET, FILM COATED ORAL
COMMUNITY
Start: 2020-11-16 | End: 2021-11-10

## 2020-11-18 RX ORDER — BENZONATATE 100 MG/1
200 CAPSULE ORAL 3 TIMES DAILY PRN
Qty: 60 CAP | Refills: 0 | Status: SHIPPED | OUTPATIENT
Start: 2020-11-18 | End: 2021-11-10

## 2020-11-18 ASSESSMENT — FIBROSIS 4 INDEX: FIB4 SCORE: 0.91

## 2020-11-18 NOTE — PATIENT INSTRUCTIONS
INSTRUCTIONS FOR COVID-19 OR ANY OTHER INFECTIOUS RESPIRATORY ILLNESSES    The Centers for Disease Control and Prevention (CDC) states that early indications for COVID-19 include cough, shortness of breath, difficulty breathing, or at least two of the following symptoms: chills, shaking with chills, muscle pain, headache, sore throat, and loss of taste or smell. Symptoms can range from mild to severe and may appear up to two weeks after exposure to the virus.    The practice of self-isolation and quarantine helps protect the public and your family by  preventing exposure to people who have or may have a contagious disease. Please follow the prevention steps below as based on CDC guidelines:    WHEN TO STOP ISOLATION: Persons with COVID-19 or any other infectious respiratory illness who have symptoms and were advised to care for themselves at home may discontinue home isolation under the following conditions:  · At least 24 hours have passed since recovery defined as resolution of fever without the use of fever-reducing medications; AND,  · Improvement in respiratory symptoms (e.g., cough, shortness of breath); AND,  · At least 10 days have passed since symptoms first appeared and have had no subsequent illness.    MONITOR YOUR SYMPTOMS: If your illness is worsening, seek prompt medical attention. If you have a medical emergency and need to call 911, notify the dispatch personnel that you have, or are being evaluated for confirmed or suspected COVID-19 or another infectious respiratory illness. Wear a facemask if possible.    ACTIVITY RESTRICTION: restrict activities outside your home, except for getting medical care. Do not go to work, school, or public areas. Avoid using public transportation, ride-sharing, or taxis.    SCHEDULED MEDICAL APPOINTMENTS: Notify your provider that you have, or are being evaluated for, confirmed or suspected COVID-19 or another infectious respiratory. This will help the healthcare  provider’s office safely take care of you and keep other people from getting exposed or infected.    FACEMASKS, when to wear: Anytime you are away from your home or around other people or pets. If you are unable to wear one, maintain a minimum of 6 feet distancing from others.    LIVING ENVIRONMENT: Stay in a separate room from other people and pets. If possible, use a separate bathroom, have someone else care for your pets and avoid sharing household items. Any items used should be washed thoroughly with soap and water. Clean all “high-touch” surfaces every day. Use a household cleaning spray or wipe, according to the label instructions. High touch surfaces include (but are not limited to) counters, tabletops, doorknobs, bathroom fixtures, toilets, phones, keyboards, tablets, and bedside tables.     HAND WASHING: Frequently wash hands with soap and water for at least 20 seconds,  especially after blowing your nose, coughing, or sneezing; going to the bathroom; before and after interacting with pets; and before and after eating or preparing food. If hands are visibly dirty use soap and water. If soap and water are not available, use an alcohol-based hand  with at least 60% alcohol. Avoid touching your eyes, nose, and mouth with unwashed hands. Cover your coughs and sneezes with a tissue. Throw used tissues in a lined trash can. Immediately wash your hands.    ACTIVE/FACILITATED SELF-MONITORING: Follow instructions provided by your local health department or health professionals, as appropriate. When working with your local health department check their available hours.    [facility]   Phone Number   [facility] [phone]   [facility] [place] [phone]   [place] Call 211   [place] [phone]     IF YOU HAVE CONFIRMED POSITIVE COVID-19:    Those who have completely recovered from COVID-19 may have immune-boosting antibodies in their plasma--called “convalescent plasma”--that could be  used to treat critically ill COVID19 patients.    [facility] is excited to begin working with [facility] on collecting convalescent plasma from  people who have recovered from COVID-19 as part of a program to treat patients infected with the virus. This FDA-approved “emergency investigational new drug” is a special blood product containing antibodies that may give patients an extra boost to fight the virus.    To be eligible to donate convalescent plasma, you must have a prior COVID-19 diagnosis documented by a laboratory test (or a positive test result for SARS-CoV-2 antibodies) and meet additional eligibility requirements.    If you are interested in donating convalescent plasma or have any additional questions, please contact the [facility] Convalescent Plasma  at [phone] or via e-mail at [email or web address].

## 2020-11-18 NOTE — PROGRESS NOTES
Virtual Visit: Established Patient   This visit was conducted via Zoom using secure and encrypted videoconferencing technology. The patient was in a private location in the state [facility].    The patient's identity was confirmed and verbal consent was obtained for this virtual visit.    Subjective:   CC:   Chief Complaint   Patient presents with   • Nasal Congestion     diarrhea       [place] Go is a 45 y.o. male presenting for evaluation and management of:  Patient is seen by virtual medicine platform noted above.  This is a new problem.  Patient reports onset on Friday of congestion which he thought was possibly a sinus infection.  He had a telemedicine visit and was prescribed Augmentin for suspected sinusitis.  However, over the course of the past few days the patient has developed worsening congestion, headache with diarrhea, loss of taste and loss of smell.  Patient denies fever or chills, vomiting.  Patient has had an exposure to a coworker who is now positive for COVID-19.    ROS   Denies any recent fevers or chills. No nausea or vomiting. No chest pains or shortness of breath.     No Known Allergies    Current medicines (including changes today)  Current Outpatient Medications   Medication Sig Dispense Refill   • amoxicillin-clavulanate (AUGMENTIN) 875-125 MG Tab TAKE 1 TABLET BY MOUTH TWICE DAILY FOR 7 DAYS     • metoprolol SR (TOPROL XL) 25 MG TABLET SR 24 HR Take 1 Tab by mouth every bedtime. 90 Tab 3   • lisinopril (PRINIVIL) 5 MG Tab Take 1 Tab by mouth every bedtime. 90 Tab 3   • Omega-3 Fatty Acids (FISH OIL) 1000 MG Cap capsule Take 1,000 mg by mouth every day.     • fluticasone (FLONASE) 50 MCG/ACT nasal spray Spray 1 Spray in nose every day. 16 g 11   • Cholecalciferol (VITAMIN D-3) 5000 units Tab Take  by mouth.     • cetirizine (ZYRTEC) 10 MG Tab Take 10 mg by mouth every day.       No current facility-administered medications for this visit.        Patient Active Problem List     "Diagnosis Date Noted   • Environmental allergies 10/22/2020   • Overweight (BMI 25.0-29.9) 11/04/2019   • Gastroesophageal reflux disease without esophagitis 12/03/2018   • Heart palpitations 11/05/2018   • Mixed hyperlipidemia 11/03/2017   • Low serum vitamin D 11/03/2017   • Essential hypertension 11/03/2016       Family History   Problem Relation Age of Onset   • Hypertension Mother    • Hyperlipidemia Mother    • Hypertension Father    • Heart Disease Father         Afib, blood clots, cardiac ablation   • Other Sister         cholecystectomy   • Cancer Paternal Aunt         colon ca   • Heart Disease Paternal Grandmother         open heart surgery   • Other Paternal Grandmother         liver cirrhosis   • Cancer Paternal Grandfather    • Stroke Cousin    • Alcohol/Drug Maternal Uncle    • Diabetes Maternal Uncle    • Hypertension Maternal Grandfather        He  has a past medical history of Allergy, GERD (gastroesophageal reflux disease), Heart palpitations, Hiatal hernia, and Hypertension.  He  has a past surgical history that includes hydrocelectomy adult; vasectomy (2004); eye surgery (Bilateral, 09/2018); and hernia repair (Right).       Objective:   Temp 36.9 °C (98.4 °F) (Oral)   Ht 1.88 m (6' 2\")   Wt 102.1 kg (225 lb)   BMI 28.89 kg/m²     Physical Exam:  Constitutional: Alert, no distress, well-groomed.  Skin: No rashes in visible areas.  Eye: Round. Conjunctiva clear, lids normal. No icterus.   ENMT: Lips pink without lesions, good dentition, moist mucous membranes. Phonation normal.  Neck: No masses, no thyromegaly. Moves freely without pain.  Respiratory: Unlabored respiratory effort, no cough or audible wheeze  Psych: Alert and oriented x3, normal affect and mood.       Assessment and Plan:   The following treatment plan was discussed:   1. Exposure to COVID-19 virus  - COVID/SARS COV-2 PCR; Future    2. Suspected COVID-19 virus infection  - COVID/SARS COV-2 PCR; Future  - benzonatate (TESSALON) " 100 MG Cap; Take 2 Caps by mouth 3 times a day as needed.  Dispense: 60 Cap; Refill: 0    3. Diarrhea, unspecified type  - COVID/SARS COV-2 PCR; Future    4. Taste absent  - COVID/SARS COV-2 PCR; Future    5. Abnormal smell  - COVID/SARS COV-2 PCR; Future      Recommend discontinuation of antibiotic as empiric antibiotics would not be warranted in the setting of COVID-19.  Patient had sudden onset of nasal congestion and only had congestion for 1 day prior to being prescribed antibiotic.  Therefore, I have encouraged the patient to discontinue antibiotic at this time.    Patient is given Tessalon Perles as needed for cough.    Testing performed for COVID-19.  Patient/guardian is given printed instructions regarding self-isolation.  Work/school note is provided with specific return to work/school protocols.  Reviewed with patient/guardian that if they do test positive they will be contacted by their local health department regarding return to work/school protocols.  Results will also be [facility] and call to the patient/guardian directly.  Encouraged mask use, frequent handwashing, wiping down hard surfaces, etc.    Nasal saline rinse  Over-the-counter Flonase nasal spray per 's instructions  Mucinex as needed    Increase fluids, rest.  Patient may use salt water gargles, ice pops, cool fluids.    May use Tylenol or ibuprofen over-the-counter as needed for pain or fever not to exceed recommended daily dose.    Red flag warning symptoms and strict ER/follow-up precautions given.  The patient demonstrated a good understanding and agreed with the treatment plan.  Please note that this note was created using voice recognition speech to text software. Every effort has been made to correct obvious errors.  However, I expect there are errors of grammar and possibly context that were not discovered prior to finalizing the note  LUCAS Baltazar PA-C      Follow-up: Return if symptoms  worsen or fail to improve.

## 2020-11-18 NOTE — LETTER
November 18, 2020         Patient: Montana Go   YOB: 1975   Date of Visit: 11/18/2020           To Whom it May Concern,     A concern for COVID-19 illness has been identified and testing is in progress. The results are available through our electronic delivery system called [employer].      We are asking employers to excuse absences while they follow self-isolation protocol per CDC guidelines:      • At least 10 days since symptoms first appeared and    • At least 24 hours with no fever greater than 100.4 F without fever-reducing medication and    • Symptoms have improved     If results are negative you must continue to follow the self-isolation protocol.        If the results of testing are positive then you will be contacted by your [facility] department for further instructions on duration of self-isolation and return to work.  In general, this will also follow the CDC guidelines with a minimum of 10 days from the onset of symptoms and symptoms are improving without fever.      This is the only note that will be provided from [facility] for this visit. Please schedule a visit with a primary care provider if documents for FMLA, disability, or unemployment are required.   Sincerely,?     Virginia Baltazar P.A.-C.  Electronically Signed

## 2020-11-19 LAB — COVID ORDER STATUS COVID19: NORMAL

## 2020-11-20 LAB
SARS-COV-2 RNA RESP QL NAA+PROBE: DETECTED
SPECIMEN SOURCE: ABNORMAL

## 2021-11-04 DIAGNOSIS — I10 ESSENTIAL HYPERTENSION: ICD-10-CM

## 2021-11-04 RX ORDER — LISINOPRIL 5 MG/1
5 TABLET ORAL
Qty: 90 TABLET | Refills: 0 | Status: SHIPPED | OUTPATIENT
Start: 2021-11-04 | End: 2021-11-10 | Stop reason: SDUPTHER

## 2021-11-04 NOTE — TELEPHONE ENCOUNTER
Received request via: Patient    Was the patient seen in the last year in this department? No     Does the patient have an active prescription (recently filled or refills available) for medication(s) requested? No     Patient is scheduled with you 11/10/21 and will run out of this medication on 10/6/21.

## 2021-11-05 NOTE — TELEPHONE ENCOUNTER
Requested Prescriptions     Pending Prescriptions Disp Refills   • lisinopril (PRINIVIL) 5 MG Tab 90 Tablet 0     Sig: Take 1 Tablet by mouth at bedtime.       KEIRA Ross.

## 2021-11-10 ENCOUNTER — OFFICE VISIT (OUTPATIENT)
Dept: MEDICAL GROUP | Facility: PHYSICIAN GROUP | Age: 46
End: 2021-11-10
Payer: COMMERCIAL

## 2021-11-10 VITALS
SYSTOLIC BLOOD PRESSURE: 104 MMHG | HEIGHT: 74 IN | OXYGEN SATURATION: 98 % | DIASTOLIC BLOOD PRESSURE: 70 MMHG | HEART RATE: 72 BPM | BODY MASS INDEX: 29.52 KG/M2 | WEIGHT: 230 LBS | TEMPERATURE: 97.8 F

## 2021-11-10 DIAGNOSIS — E78.2 MIXED HYPERLIPIDEMIA: ICD-10-CM

## 2021-11-10 DIAGNOSIS — E66.3 OVERWEIGHT (BMI 25.0-29.9): ICD-10-CM

## 2021-11-10 DIAGNOSIS — I10 ESSENTIAL HYPERTENSION: ICD-10-CM

## 2021-11-10 DIAGNOSIS — E55.9 VITAMIN D DEFICIENCY: ICD-10-CM

## 2021-11-10 DIAGNOSIS — Z23 NEED FOR VACCINATION: ICD-10-CM

## 2021-11-10 DIAGNOSIS — Z00.00 ENCOUNTER FOR WELL ADULT EXAM WITHOUT ABNORMAL FINDINGS: ICD-10-CM

## 2021-11-10 DIAGNOSIS — Z00.00 ROUTINE HEALTH MAINTENANCE: ICD-10-CM

## 2021-11-10 DIAGNOSIS — R00.2 PALPITATIONS: ICD-10-CM

## 2021-11-10 DIAGNOSIS — Z91.09 ENVIRONMENTAL ALLERGIES: ICD-10-CM

## 2021-11-10 DIAGNOSIS — K21.9 GASTROESOPHAGEAL REFLUX DISEASE WITHOUT ESOPHAGITIS: ICD-10-CM

## 2021-11-10 PROCEDURE — 99396 PREV VISIT EST AGE 40-64: CPT | Mod: 25 | Performed by: NURSE PRACTITIONER

## 2021-11-10 PROCEDURE — 90471 IMMUNIZATION ADMIN: CPT | Performed by: NURSE PRACTITIONER

## 2021-11-10 PROCEDURE — 90686 IIV4 VACC NO PRSV 0.5 ML IM: CPT | Performed by: NURSE PRACTITIONER

## 2021-11-10 RX ORDER — LISINOPRIL 5 MG/1
5 TABLET ORAL
Qty: 90 TABLET | Refills: 3 | Status: SHIPPED | OUTPATIENT
Start: 2021-11-10 | End: 2022-12-21

## 2021-11-10 RX ORDER — METOPROLOL SUCCINATE 25 MG/1
25 TABLET, EXTENDED RELEASE ORAL
Qty: 90 TABLET | Refills: 3 | Status: SHIPPED | OUTPATIENT
Start: 2021-11-10 | End: 2022-12-21

## 2021-11-10 RX ORDER — FLUTICASONE PROPIONATE 50 MCG
1 SPRAY, SUSPENSION (ML) NASAL DAILY
Qty: 16 G | Refills: 11 | Status: SHIPPED | OUTPATIENT
Start: 2021-11-10 | End: 2021-12-20

## 2021-11-10 ASSESSMENT — PATIENT HEALTH QUESTIONNAIRE - PHQ9: CLINICAL INTERPRETATION OF PHQ2 SCORE: 0

## 2021-11-10 ASSESSMENT — FIBROSIS 4 INDEX: FIB4 SCORE: 0.93

## 2021-11-11 NOTE — PROGRESS NOTES
Subjective:     CC:   Chief Complaint   Patient presents with   • Annual Exam     HPI:   Montana Go is a 46 y.o. male who presents for annual exam    Last Colorectal Cancer Screening: N/A  Last Tdap: 11/5/2018  Received HPV series: Aged out  Hx STDs: No    Exercise: tries to walk mon-fri, walks dogs on weekends  Diet: A lot better. A lot more vegetables, fish. Joined [employer] 1 month ago. Lost 10 lbs    He  has a past medical history of Allergy, GERD (gastroesophageal reflux disease), Heart palpitations, Hiatal hernia, and Hypertension.  He  has a past surgical history that includes hydrocelectomy adult; vasectomy (2004); eye surgery (Bilateral, 09/2018); and hernia repair (Right).    Family History   Problem Relation Age of Onset   • Hypertension Mother    • Hyperlipidemia Mother    • Hypertension Father    • Heart Disease Father         Afib, blood clots, cardiac ablation   • Other Sister         cholecystectomy   • Cancer Paternal Aunt         colon ca   • Heart Disease Paternal Grandmother         open heart surgery   • Other Paternal Grandmother         liver cirrhosis   • Cancer Paternal Grandfather    • Stroke Cousin    • Alcohol/Drug Maternal Uncle    • Diabetes Maternal Uncle    • Hypertension Maternal Grandfather      Social History     Tobacco Use   • Smoking status: Never Smoker   • Smokeless tobacco: Never Used   Vaping Use   • Vaping Use: Never used   Substance Use Topics   • Alcohol use: Yes     Alcohol/week: 3.0 - 3.6 oz     Types: 5 - 6 Cans of beer per week   • Drug use: No     He  reports being sexually active and has had partner(s) who are female. He reports using the following method of birth control/protection: Surgical.    Patient Active Problem List    Diagnosis Date Noted   • Environmental allergies 10/22/2020   • Overweight (BMI 25.0-29.9) 11/04/2019   • Gastroesophageal reflux disease without esophagitis 12/03/2018   • Heart palpitations 11/05/2018   • Mixed hyperlipidemia  "11/03/2017   • Vitamin D deficiency 11/03/2017   • Essential hypertension 11/03/2016     Current Outpatient Medications   Medication Sig Dispense Refill   • fluticasone (FLONASE) 50 MCG/ACT nasal spray Administer 1 Spray into affected nostril(S) every day. 16 g 11   • lisinopril (PRINIVIL) 5 MG Tab Take 1 Tablet by mouth at bedtime. 90 Tablet 3   • metoprolol SR (TOPROL XL) 25 MG TABLET SR 24 HR Take 1 Tablet by mouth at bedtime. 90 Tablet 3   • Omega-3 Fatty Acids (FISH OIL) 1000 MG Cap capsule Take 1,000 mg by mouth every day.     • Cholecalciferol (VITAMIN D-3) 5000 units Tab Take  by mouth.     • cetirizine (ZYRTEC) 10 MG Tab Take 10 mg by mouth every day.       No current facility-administered medications for this visit.     No Known Allergies    Review of Systems   Constitutional: Negative for fever, chills and malaise/fatigue.   HENT: + Allergies, postnasal drainage, cough.  Negative for congestion.    Eyes: Negative for pain.   Respiratory: Negative for shortness of breath.    Cardiovascular: Negative for chest pain and leg swelling.   Gastrointestinal: Negative for nausea, vomiting, abdominal pain and diarrhea.   Genitourinary: Negative for dysuria and hematuria.   Skin: Negative for rash.   Neurological: Negative for dizziness, focal weakness and headaches.   Endo/Heme/Allergies: Does not bruise/bleed easily.   Psychiatric/Behavioral: Negative for depression.  The patient is not nervous/anxious.      Objective:   /70 (BP Location: Left arm, Patient Position: Sitting, BP Cuff Size: Adult)   Pulse 72   Temp 36.6 °C (97.8 °F) (Temporal)   Ht 1.88 m (6' 2\")   Wt 104 kg (230 lb)   SpO2 98%   BMI 29.53 kg/m²      Wt Readings from Last 4 Encounters:   11/10/21 104 kg (230 lb)   11/18/20 102 kg (225 lb)   10/22/20 104 kg (230 lb)   11/04/19 106 kg (234 lb)     Physical Exam:  Constitutional: Well-developed and well-nourished. Not diaphoretic. No distress.   Skin: Skin is warm and dry. No rash " noted.  Head: Atraumatic without lesions.  Eyes: Conjunctivae and extraocular motions are normal. Pupils are equal, round, and reactive to light. No scleral icterus.   Ears:  External ears unremarkable. Tympanic membranes clear and intact.  Nose: Nares patent. Septum midline. Turbinates without erythema nor edema. No discharge.   Mouth/Throat: Tongue normal. Oropharynx is clear and moist. Posterior pharynx without erythema or exudates.  Neck: Supple, trachea midline. Normal range of motion. No thyromegaly present. No lymphadenopathy--cervical or supraclavicular.  Cardiovascular: Regular rate and rhythm, S1 and S2 without murmur, rubs, or gallops.    Respiratory: Effort normal. Clear to auscultation throughout. No adventitious sounds.   Abdomen: Soft, non tender, and without distention. Active bowel sounds in all four quadrants. No rebound, guarding.  Extremities: No cyanosis, clubbing, erythema, nor edema.  Radial pulses intact and symmetric.   Musculoskeletal: All major joints AROM full in all directions without pain.  Neurological: Alert and oriented x 3. Grossly non-focal. Strength and sensation grossly intact.   Psychiatric:  Behavior, mood, and affect are appropriate.    Assessment and Plan:   1. Encounter for well adult exam without abnormal findings    2. Essential hypertension  3. Heart palpitations  Chronic, ongoing.  Continue lisinopril 5 mg every bedtime and metoprolol SR 25 mg daily, refill sent to pharmacy.  Due for annual labs in October 2022.  - Comp Metabolic Panel; Future  - Lipid Profile; Future  - MICROALBUMIN CREAT RATIO URINE; Future  - TSH WITH REFLEX TO FT4; Future  - lisinopril (PRINIVIL) 5 MG Tab; Take 1 Tablet by mouth at bedtime.  Dispense: 90 Tablet; Refill: 3  - metoprolol SR (TOPROL XL) 25 MG TABLET SR 24 HR; Take 1 Tablet by mouth at bedtime.  Dispense: 90 Tablet; Refill: 3    4. Mixed hyperlipidemia  Chronic, ongoing.  Encourage healthy diet, regular exercise, weight loss.  Due for  updated annual labs in October 2022.  - Comp Metabolic Panel; Future  - Lipid Profile; Future    5. Overweight (BMI 25.0-29.9)  Chronic, ongoing.  Encouraged diet high in fruits, vegetables, and fiber. And a diet low in salt, refined carbohydrates, cholesterol, saturated fat, and trans fatty acids.    Encouraged a minimum of 30 minutes of moderate intensity aerobic exercise (eg, brisk walking) is recommended on five days each week. Or 30 minutes of vigorous-intensity aerobic exercise (eg, jogging) on three days each week.   Patient's body mass index is 29.53 kg/m². Exercise and nutrition counseling were performed at this visit.  Due for annual labs in October 2022.  - CBC WITH DIFFERENTIAL; Future  - Comp Metabolic Panel; Future  - Lipid Profile; Future  - TSH WITH REFLEX TO FT4; Future    6. Gastroesophageal reflux disease without esophagitis  Due for updated annual labs in October 2022.  - CBC WITH DIFFERENTIAL; Future  - Comp Metabolic Panel; Future  - TSH WITH REFLEX TO FT4; Future    7. Environmental allergies  Chronic, ongoing.  Continue fluticasone nasal spray daily, refill sent to pharmacy.  - fluticasone (FLONASE) 50 MCG/ACT nasal spray; Administer 1 Spray into affected nostril(S) every day.  Dispense: 16 g; Refill: 11    8. Vitamin D deficiency  Chronic, stable.  Continue over-the-counter vitamin D supplement daily.  Due for updated annual labs in October 2022.  - VITAMIN D,25 HYDROXY; Future    9. Need for vaccination  Given today.  - INFLUENZA VACCINE QUAD INJ (PF)    10. Routine health maintenance  Due for updated annual labs in October 2022.  - CBC WITH DIFFERENTIAL; Future  - Comp Metabolic Panel; Future  - Lipid Profile; Future  - MICROALBUMIN CREAT RATIO URINE; Future  - TSH WITH REFLEX TO FT4; Future  - VITAMIN D,25 HYDROXY; Future     Health maintenance: Up-to-date  Labs per orders  Immunizations per orders  Patient counseled about skin care, diet, supplements, and exercise.  Discussed diet and  exercise, colorectal cancer screening and adequate intake of calcium and vitamin D     I have placed the below orders and discussed them with an approved delegating provider.  The MA is performing the below orders under the direction of Dr. Thompson.     Follow-up: Return in about 1 year (around 11/10/2022) for Preventative Annual, Follow up Labs, As needed.     Please note that this dictation was created using voice recognition software. I have worked with consultants from the vendor as well as technical experts from [employer] to optimize the interface. I have made every reasonable attempt to correct obvious errors, but I expect that there are errors of grammar and possibly content that I did not discover before finalizing the note.

## 2021-12-19 DIAGNOSIS — Z91.09 ENVIRONMENTAL ALLERGIES: ICD-10-CM

## 2021-12-20 RX ORDER — FLUTICASONE PROPIONATE 50 MCG
SPRAY, SUSPENSION (ML) NASAL
Qty: 16 G | Refills: 3 | Status: SHIPPED | OUTPATIENT
Start: 2021-12-20 | End: 2022-12-10

## 2021-12-21 NOTE — TELEPHONE ENCOUNTER
Requested Prescriptions     Pending Prescriptions Disp Refills   • fluticasone (FLONASE) 50 MCG/ACT nasal spray [Pharmacy Med Name: Fluticasone Propionate 50 MCG/ACT Nasal Suspension] 16 g 3     Sig: Use 1 spray(s) in each nostril once daily       KEIRA Ross.

## 2022-02-01 NOTE — PROGRESS NOTES
CC:   Chief Complaint   Patient presents with   • Results     labs       HISTORY OF THE PRESENT ILLNESS: Patient is a 43 y.o. male. This pleasant patient is here today to follow-up on lab work.    Health Maintenance: The patient is up-to-date on health maintenance except for the annual flu vaccine, patient states that he will be getting it at work.    Essential hypertension  This is a chronic problem for the patient that is controlled.  The patient's blood pressure today is 116/64 with a heart rate of 71.  Patient continues to take lisinopril 5 mg/day, denies any side effects of the medication including a cough.  The patient does not need a refill of this medication at this time.  He will be having a consultation appointment with cardiology on 1/15/2019 regarding his palpitations. The patient denies chest pain, shortness of breath, headaches, dizziness, blurry vision, or dyspnea on exertion.    Heart palpitations  This is a chronic problem for the patient that is improving.  The patient had previously reported that he had history of heart palpitations a few years ago that he was seeing cardiology for.  The problem had mostly resolved until this past summer when he was having heart palpitations a few nights per week which were waking him up.  The patient was seen by me on 11/5/2018 and was prescribed propanolol 10 mg to be taken 1 time at night as needed.  The patient states that he has taken the propanolol in the evening about 9 times in the past month and reports that the medication helps.  He is asking if he can take this medication during the day if he has that feeling of a racing heart.  He does have an appointment with cardiology on 1/15/2019 for this issue.    Low serum vitamin D  This is a chronic problem for the patient that is controlled.  The patient's most recent vitamin D level on 11/26/2018 was 35.  The patient continues to take a vitamin D supplement over-the-counter 500 units/day.    Mixed  Procedure:  INSERTION URETERAL CATHETERS PREOP (Bilateral Ureter)  RESECTION COLON SIGMOID LAPAROSCOPIC HAND-ASSISTED AND TAKEDOWN OF COLOVESICAL FISTULA (N/A Abdomen)    Relevant Problems   CARDIO   (+) Hypertension      Other   (+) Colovesical fistula   (+) Diverticulitis of colon with perforation   (+) Diverticulosis of large intestine   (+) Nausea & vomiting      Lab Results   Component Value Date    WBC 9 05 01/22/2022    HGB 14 3 01/22/2022    HCT 45 4 01/22/2022    MCV 96 01/22/2022     01/22/2022     Lab Results   Component Value Date    K 4 3 01/22/2022    CO2 29 01/22/2022     01/22/2022    BUN 11 01/22/2022    CREATININE 1 03 01/22/2022     Lab Results   Component Value Date    HGBA1C 5 8 (H) 01/22/2022     Type and Screen:  O    Physical Exam    Airway    Mallampati score: II  TM Distance: >3 FB  Neck ROM: full     Dental       Cardiovascular      Pulmonary      Other Findings        Anesthesia Plan  ASA Score- 3     Anesthesia Type- general with ASA Monitors  Additional Monitors:   Airway Plan: ETT  Comment: Possible arterial line  Possible postoperative truncal blocks for postoperative pain control discussed with R/B/A  Will utilize if conversion to open procedure if large hand port   Plan Factors-Exercise tolerance (METS): >4 METS  Chart reviewed  Existing labs reviewed  Patient summary reviewed              Induction-     Postoperative Plan-     Informed Consent- hyperlipidemia  This is a chronic problem for the patient that is uncontrolled. The patient's most recent lipid profile was completed on 11/26/2018.  Total cholesterol was 222, triglycerides 255, HDL 43, .  The patient's 10-year cardiac risk score is 2.59%.  The patient is interested in making lifestyle changes, increasing his activity levels, losing weight, improving his diet.  He would like to try over-the-counter supplements such as fish oil at this time to try to avoid going on a statin.    Gastroesophageal reflux disease without esophagitis  This is a chronic problem for the patient that is uncontrolled.  Patient reports that he has acid reflux symptoms daily and has been controlling his symptoms with over-the-counter Tums.  He reports that in the past he had taken omeprazole but it gave him diarrhea so he stopped.  The patient denies waking up with a cough, however he does sleep with his head elevated.  He has had an EGD in the past and was told that he has a small hiatal hernia.    Allergies: Patient has no known allergies.    Current Outpatient Prescriptions Ordered in [facility]   Medication Sig Dispense Refill   • Cholecalciferol (VITAMIN D) 400 UNIT/ML Liquid Take  by mouth.     • cetirizine (ZYRTEC) 10 MG Tab Take 10 mg by mouth every day.     • lisinopril (PRINIVIL) 5 MG Tab Take 1 Tab by mouth every day. 90 Tab 3   • propranolol (INDERAL) 10 MG Tab Take 1 Tab by mouth at bedtime as needed (palpitations and anxiety). 90 Tab 0     No current Epic-ordered facility-administered medications on file.        Past Medical History:   Diagnosis Date   • Allergy    • GERD (gastroesophageal reflux disease)    • Heart palpitations    • Hiatal hernia     small   • Hypertension        Past Surgical History:   Procedure Laterality Date   • EYE SURGERY Bilateral 09/2018    lasik   • VASECTOMY  2004   • HERNIA REPAIR Right     inguinal   • HYDROCELECTOMY ADULT         Social History   Substance Use Topics   • Smoking  status: Never Smoker   • Smokeless tobacco: Never Used   • Alcohol use 6.0 oz/week     10 Cans of beer per week      Comment: weekly       Social History     Social History Narrative   • No narrative on file       Family History   Problem Relation Age of Onset   • Hypertension Mother    • Hyperlipidemia Mother    • Hypertension Father    • Heart Disease Father         Afib, blood clots, cardiac ablation   • Other Sister         cholecystectomy   • Cancer Paternal Aunt         colon ca   • Heart Disease Paternal Grandmother         open heart surgery   • Other Paternal Grandmother         liver cirrhosis   • Cancer Paternal Grandfather    • Stroke Cousin    • Alcohol/Drug Maternal Uncle    • Diabetes Maternal Uncle    • Hypertension Maternal Grandfather        ROS:     - Constitutional:  Negative for fever, chills, unexpected weight change, night sweats, body aches, sleep issues,  and fatigue/generalized weakness.     - HEENT:  Negative for headaches, vision changes, hearing changes, ear pain, tinnitus, ear discharge, rhinorrhea, sinus congestion, sneezing, sore throat, and neck pain.      - Respiratory:  Negative for cough, shortness of breath, sputum production, hemoptysis, chest congestion, dyspnea, wheezing, and crackles.      - Cardiovascular:  Negative for chest pain, palpitations, SHOOK, paroxsymal nocturnal dyspnea, orthopnea, and bilateral lower extremity edema.     - Gastrointestinal: Positive for heartburn.  Negative for nausea, vomiting, abdominal pain, hematochezia, melena, diarrhea, constipation, and greasy/foul-smelling stools.     - Genitourinary:  Negative for dysuria, nocturia, polyuria, hematuria, pyuria, urinary urgency, urinary frequency, and urinary incontinence.     - Musculoskeletal:  Negative for myalgias, back pain, and joint pain.     - Skin:  Negative for rash, sores, lumps, itching, cyanotic skin color change.     - Neurological:  Negative for dizziness, tingling, tremors, focal sensory  "deficit, focal weakness and headaches.     - Endo/Heme/Allergies:  Does not bruise/bleed easily. Denies cold/heat intolerance.     - Psychiatric/Behavioral: Negative for depression, suicidal/homicidal ideation and memory loss.        Exam: Blood pressure 116/64, pulse 71, temperature 36.2 °C (97.2 °F), temperature source Temporal, height 1.88 m (6' 2\"), weight 102.1 kg (225 lb), SpO2 96 %. Body mass index is 28.89 kg/m².    General:  Normal appearing. No distress.  HEENT:  Normocephalic. Eyes conjunctiva clear lids without ptosis, pupils equal and reactive to light accommodation, ears normal shape and contour.   Pulmonary:  Clear to ausculation.  Normal effort. No rales, ronchi, or wheezing.  Cardiovascular:  Regular rate and rhythm without murmur. Carotid and radial pulses are intact and equal bilaterally.  Neurologic:  Grossly nonfocal.  Skin:  Warm and dry.  No obvious lesions.  Musculoskeletal:  Normal gait. No extremity cyanosis, clubbing, or edema.  Psych:  Normal mood and affect. Alert and oriented x3. Judgment and insight is normal.    Assessment/Plan  1. Essential hypertension  This is a chronic problem for the patient that is controlled with lisinopril 5 mg/day.  The patient does not need a refill of this medication at this time.  Continue with current plan.    2. Heart palpitations  This is a chronic problem for the patient that is improving.  Patient reports that he has taken propanolol 10 mg at night about 9 times in the last month.  He reports that the medication is helping to reduce the sensation of a racing heart.  He is asking if he is allowed to take this medication during the day if he is having the racing heart feeling, I advised the patient that he can take medication up to 3 times a day.  He does have a consultation appointment with cardiology on 1/15/19 for this issue.  He does not need a refill of medication at this time.    3. Low serum vitamin D  This is a chronic problem for the patient " that is stable.  The patient continues to take over-the-counter vitamin D supplements and his most recent vitamin D level was within normal range.  Continue with vitamin D supplements.    4. Mixed hyperlipidemia  This is a chronic problem for the patient that is uncontrolled.  Reviewed with the patient options of starting a statin medication versus making lifestyle changes such as taking an over-the-counter omega-3 fatty acid supplement, increasing fiber in his diet, decreasing saturated fats and trans-fats.  We also discussed losing weight, participating in aerobic exercise at least 150 minutes/week, avoidance of sugar, alcohol, and fatty/fried food.  The patient is interested in making lifestyle changes and has a goal of 10% weight loss.  We will plan to recheck his fasting lipid profile in 1 year at his annual exam.    5. Gastroesophageal reflux disease without esophagitis  This is a chronic problem for the patient that is uncontrolled.  Patient reports that he has tried omeprazole in the past which caused diarrhea so he has been managing this issue with over-the-counter Tums as needed.  I recommended that he try an over-the-counter H2 blocker such as Pepcid and use consistently to decrease acid reflux symptoms.  Patient states that he will try an over-the-counter H2 blocker for this issue and notify me if it worsens or fails to improve.    Educated in proper administration of medication(s) ordered today including safety, possible SE, risks, benefits, rationale and alternatives to therapy.   Supportive care, differential diagnoses, and indications for immediate follow-up discussed with patient.    Pathogenesis of diagnosis discussed including typical length and natural progression.    Instructed to return to clinic or nearest emergency department for any change in condition, further concerns, or worsening of symptoms.  Patient states understanding of the plan of care and discharge instructions.    Return in about  1 year (around 12/3/2019) for Preventative Annual.    Please note that this dictation was created using voice recognition software. I have made every reasonable attempt to correct obvious errors, but I expect that there are errors of grammar and possibly content that I did not discover before finalizing the note.

## 2022-04-22 ENCOUNTER — OCCUPATIONAL MEDICINE (OUTPATIENT)
Dept: OCCUPATIONAL MEDICINE | Facility: OTHER | Age: 47
End: 2022-04-22
Payer: COMMERCIAL

## 2022-04-22 VITALS
OXYGEN SATURATION: 95 % | BODY MASS INDEX: 23.74 KG/M2 | HEIGHT: 74 IN | DIASTOLIC BLOOD PRESSURE: 70 MMHG | WEIGHT: 185 LBS | HEART RATE: 69 BPM | TEMPERATURE: 97.6 F | RESPIRATION RATE: 15 BRPM | SYSTOLIC BLOOD PRESSURE: 118 MMHG

## 2022-04-22 DIAGNOSIS — G56.22 ULNAR NEUROPATHY OF LEFT UPPER EXTREMITY: ICD-10-CM

## 2022-04-22 DIAGNOSIS — I10 ESSENTIAL HYPERTENSION: ICD-10-CM

## 2022-04-22 PROCEDURE — 99204 OFFICE O/P NEW MOD 45 MIN: CPT | Performed by: FAMILY MEDICINE

## 2022-04-22 ASSESSMENT — ENCOUNTER SYMPTOMS
SENSORY CHANGE: 0
TINGLING: 0
WEAKNESS: 1
FOCAL WEAKNESS: 1
WEIGHT LOSS: 1

## 2022-04-22 ASSESSMENT — FIBROSIS 4 INDEX: FIB4 SCORE: 0.95

## 2022-04-22 NOTE — PROGRESS NOTES
" Subjective:   [place] Go is a 47 y.o. male here for the evaluation and management of Follow-Up (NEW W/C  INJURY LT HAND )    [place] presents complaining of left hand discomfort onset in early March with resting the edge of his hand on the table.  He attempted to adjust and noted some decreasing pain on the ulnar side of his hand but developed significant weakness and limitations in his ability to to move his pinky finger on the left hand.  He does not have substantial numbness.  He reports 50 pound weight loss with lifestyle changes recently.  He drinks approximately 6 drinks weekly.  He describes his diet as balanced with an emphasis on protein.    Past medical history reviewed  No problems updated.    Review of Systems   Constitutional: Positive for weight loss.   Neurological: Positive for focal weakness and weakness. Negative for tingling and sensory change.      Objective:     Vitals:    04/22/22 1023   BP: 118/70   BP Location: Left arm   Patient Position: Sitting   BP Cuff Size: Adult   Pulse: 69   Resp: 15   Temp: 36.4 °C (97.6 °F)   TempSrc: Temporal   SpO2: 95%   Weight: 83.9 kg (185 lb)   Height: 1.88 m (6' 2\")     Body mass index is 23.75 kg/m².     Physical Exam  Constitutional:       Appearance: Normal appearance.   HENT:      Head: Normocephalic.   Eyes:      Extraocular Movements: Extraocular movements intact.      Conjunctiva/sclera: Conjunctivae normal.   Neurological:      Mental Status: He is alert. Mental status is at baseline.   Psychiatric:         Mood and Affect: Mood normal.         Behavior: Behavior normal.     Left hand-inspection demonstrates mild atrophy of the hypothenar area, nontender to palpation, 4-5 strength with testing the ulnar intrinsic muscles, 5 out of 5 strength with testing thumb extension and pincer grasp, sensations intact light touch, biceps, brachioradialis and triceps reflexes are +1 bilaterally    Assessment and Plan:   Montana Go is a 47 " y.o. male with a Follow-Up (NEW W/C  INJURY LT HAND )     Reviewed previous medical history and discussed differential diagnosis for ulnar neuropathy, based on his ergonomics it could be that he is resting his elbow on his chair.  He describes poor ergonomics since he has been displaced from his usual office.  I recommend laboratory studies looking into metabolic causes of neuropathy based on recent intentional weight loss.  Additionally I recommended ergonomic evaluation of an have also instructed him to no longer rest his elbow on his chair, close follow-up in 2 weeks    Problem List Items Addressed This Visit     Essential hypertension      Other Visit Diagnoses     Ulnar neuropathy of left upper extremity        Relevant Orders    TSH WITH REFLEX TO FT4    FOLATE    VITAMIN B12    CBC WITHOUT DIFFERENTIAL    Comp Metabolic Panel          Followup: No follow-ups on file.    Abner Land M.D.    Please note that this dictation was created using voice recognition software. I have made every reasonable attempt to correct obvious errors, but I expect that there are errors of grammar and possibly content that I did not discover before finalizing the note.

## 2022-04-22 NOTE — LETTER
2022    Patient: Montana Go  : 1975  Provider: Abner Land M.D.    RETURN TO WORK    BODY PART: LEFT ARM  EMPLOYER:[employer]  DOI: 3/7/22    It is the injured worker's responsibility to inform the employer of current work status.    CURRENT RESTRICTIONS: FULL DUTY    CONDITION STABLE: NO  CONDITION RATABLE: NO    RETURN VISIT: Return in about 2 weeks (around 2022).    Electronically Signed: Abner Land M.D.

## 2022-05-06 ENCOUNTER — OFFICE VISIT (OUTPATIENT)
Dept: OCCUPATIONAL MEDICINE | Facility: OTHER | Age: 47
End: 2022-05-06
Payer: COMMERCIAL

## 2022-05-06 VITALS
TEMPERATURE: 97.9 F | SYSTOLIC BLOOD PRESSURE: 110 MMHG | BODY MASS INDEX: 24.64 KG/M2 | WEIGHT: 192 LBS | OXYGEN SATURATION: 98 % | HEART RATE: 68 BPM | DIASTOLIC BLOOD PRESSURE: 76 MMHG | HEIGHT: 74 IN

## 2022-05-06 DIAGNOSIS — G56.22 ULNAR NEUROPATHY AT ELBOW OF LEFT UPPER EXTREMITY: ICD-10-CM

## 2022-05-06 PROCEDURE — 99213 OFFICE O/P EST LOW 20 MIN: CPT | Performed by: FAMILY MEDICINE

## 2022-05-06 ASSESSMENT — ENCOUNTER SYMPTOMS
WEAKNESS: 1
FOCAL WEAKNESS: 1
WEIGHT LOSS: 1
SENSORY CHANGE: 0
TINGLING: 0

## 2022-05-06 ASSESSMENT — PATIENT HEALTH QUESTIONNAIRE - PHQ9: CLINICAL INTERPRETATION OF PHQ2 SCORE: 0

## 2022-05-06 ASSESSMENT — FIBROSIS 4 INDEX: FIB4 SCORE: 0.73

## 2022-05-06 NOTE — PROGRESS NOTES
" Subjective:   Montana Go is a 47 y.o. male here for the evaluation and management of Follow-Up (W/C follow up left hand)    [place]ana presents complaining of left hand discomfort onset in early March with resting the edge of his hand on the table.  He attempted to adjust and noted some decreasing pain on the ulnar side of his hand but developed significant weakness and limitations in his ability to to move his pinky finger on the left hand.  He does not have substantial numbness.      He reports adjustment in ergonomics with no significant change in his current symptoms.  Essentially he is got left hand weakness with associated loss of dexterity.  Past medical history reviewed  No problems updated.    Review of Systems   Constitutional: Positive for weight loss.   Neurological: Positive for focal weakness and weakness. Negative for tingling and sensory change.      Objective:     Vitals:    05/06/22 0831   BP: 110/76   BP Location: Left arm   Patient Position: Sitting   Pulse: 68   Temp: 36.6 °C (97.9 °F)   SpO2: 98%   Weight: 87.1 kg (192 lb)   Height: 1.88 m (6' 2\")     Body mass index is 24.65 kg/m².     Physical Exam  Constitutional:       Appearance: Normal appearance.   HENT:      Head: Normocephalic.   Eyes:      Extraocular Movements: Extraocular movements intact.      Conjunctiva/sclera: Conjunctivae normal.   Neurological:      Mental Status: He is alert. Mental status is at baseline.   Psychiatric:         Mood and Affect: Mood normal.         Behavior: Behavior normal.     Left hand-inspection demonstrates mild atrophy of the hypothenar area, nontender to palpation, 4/5 strength with testing the ulnar intrinsic muscles, 5 out of 5 strength with testing thumb extension and pincer grasp, sensations intact light touch, biceps, brachioradialis and triceps reflexes are trace bilaterally    Left elbow-normal    Assessment and Plan:   Montana Go is a 47 y.o. male with a Follow-Up (W/C " follow up left hand)     Reviewed laboratory studies with benign findings with no obvious metabolic or endocrine explanation for neuropathy.  He reports adjustment in ergonomics with removal of his elbow rest and has not noticed improvement in his symptoms.  At this point I would recommend proceeding with nerve testing for further evaluation of ulnar neuropathy.    Problem List Items Addressed This Visit    None     Visit Diagnoses     Ulnar neuropathy at elbow of left upper extremity        Relevant Orders    Referral to Pain Clinic          Followup: Return in about 4 weeks (around 6/3/2022).    Abner Land M.D.    Please note that this dictation was created using voice recognition software. I have made every reasonable attempt to correct obvious errors, but I expect that there are errors of grammar and possibly content that I did not discover before finalizing the note.

## 2022-05-06 NOTE — LETTER
2022    Patient: Montana Go  : 1975  Provider: Abner Land M.D.    RETURN TO WORK    BODY PART: LEFT ARM  EMPLOYER:[employer]  DOI: 3/7/22    It is the injured worker's responsibility to inform the employer of current work status.    CURRENT RESTRICTIONS: FULL DUTY    CONDITION STABLE: NO  CONDITION RATABLE: NO     CONDITION STABLE: NO  CONDITION RATABLE: NO    RETURN VISIT: Return in about 4 weeks (around 6/3/2022).    Electronically Signed: Abner Land M.D.

## 2022-06-03 ENCOUNTER — APPOINTMENT (OUTPATIENT)
Dept: MEDICAL GROUP | Facility: OTHER | Age: 47
End: 2022-06-03
Payer: COMMERCIAL

## 2022-12-09 DIAGNOSIS — Z91.09 ENVIRONMENTAL ALLERGIES: ICD-10-CM

## 2022-12-10 RX ORDER — FLUTICASONE PROPIONATE 50 MCG
SPRAY, SUSPENSION (ML) NASAL
Qty: 16 G | Refills: 0 | Status: SHIPPED | OUTPATIENT
Start: 2022-12-10 | End: 2022-12-21 | Stop reason: SDUPTHER

## 2022-12-10 NOTE — TELEPHONE ENCOUNTER
Requested Prescriptions     Pending Prescriptions Disp Refills   • fluticasone (FLONASE) 50 MCG/ACT nasal spray [Pharmacy Med Name: Fluticasone Propionate 50 MCG/ACT Nasal Suspension] 16 g 0     Sig: SPRAY 1 SPRAY(S) IN EACH NOSTRIL ONCE DAILY       KEIRA Ross.

## 2022-12-21 ENCOUNTER — OFFICE VISIT (OUTPATIENT)
Dept: MEDICAL GROUP | Facility: PHYSICIAN GROUP | Age: 47
End: 2022-12-21
Payer: COMMERCIAL

## 2022-12-21 VITALS
WEIGHT: 189 LBS | RESPIRATION RATE: 16 BRPM | TEMPERATURE: 98.4 F | OXYGEN SATURATION: 99 % | HEIGHT: 74 IN | BODY MASS INDEX: 24.26 KG/M2 | HEART RATE: 56 BPM | DIASTOLIC BLOOD PRESSURE: 64 MMHG | SYSTOLIC BLOOD PRESSURE: 112 MMHG

## 2022-12-21 DIAGNOSIS — E55.9 VITAMIN D DEFICIENCY: ICD-10-CM

## 2022-12-21 DIAGNOSIS — K21.9 GASTROESOPHAGEAL REFLUX DISEASE WITHOUT ESOPHAGITIS: ICD-10-CM

## 2022-12-21 DIAGNOSIS — R00.2 PALPITATIONS: ICD-10-CM

## 2022-12-21 DIAGNOSIS — I10 ESSENTIAL HYPERTENSION: ICD-10-CM

## 2022-12-21 DIAGNOSIS — E66.3 OVERWEIGHT (BMI 25.0-29.9): ICD-10-CM

## 2022-12-21 DIAGNOSIS — E78.2 MIXED HYPERLIPIDEMIA: ICD-10-CM

## 2022-12-21 DIAGNOSIS — Z00.00 ROUTINE HEALTH MAINTENANCE: ICD-10-CM

## 2022-12-21 DIAGNOSIS — Z00.00 ENCOUNTER FOR WELL ADULT EXAM WITHOUT ABNORMAL FINDINGS: ICD-10-CM

## 2022-12-21 DIAGNOSIS — Z23 NEED FOR VACCINATION: ICD-10-CM

## 2022-12-21 DIAGNOSIS — Z91.09 ENVIRONMENTAL ALLERGIES: ICD-10-CM

## 2022-12-21 DIAGNOSIS — Z11.59 NEED FOR HEPATITIS C SCREENING TEST: ICD-10-CM

## 2022-12-21 PROCEDURE — 90471 IMMUNIZATION ADMIN: CPT | Performed by: NURSE PRACTITIONER

## 2022-12-21 PROCEDURE — 90686 IIV4 VACC NO PRSV 0.5 ML IM: CPT | Performed by: NURSE PRACTITIONER

## 2022-12-21 PROCEDURE — 99396 PREV VISIT EST AGE 40-64: CPT | Mod: 25 | Performed by: NURSE PRACTITIONER

## 2022-12-21 RX ORDER — FLUTICASONE PROPIONATE 50 MCG
1 SPRAY, SUSPENSION (ML) NASAL DAILY
Qty: 16 G | Refills: 3 | Status: SHIPPED | OUTPATIENT
Start: 2022-12-21

## 2022-12-21 SDOH — ECONOMIC STABILITY: TRANSPORTATION INSECURITY
IN THE PAST 12 MONTHS, HAS THE LACK OF TRANSPORTATION KEPT YOU FROM MEDICAL APPOINTMENTS OR FROM GETTING MEDICATIONS?: NO

## 2022-12-21 SDOH — ECONOMIC STABILITY: FOOD INSECURITY: WITHIN THE PAST 12 MONTHS, YOU WORRIED THAT YOUR FOOD WOULD RUN OUT BEFORE YOU GOT MONEY TO BUY MORE.: NEVER TRUE

## 2022-12-21 SDOH — ECONOMIC STABILITY: FOOD INSECURITY: WITHIN THE PAST 12 MONTHS, THE FOOD YOU BOUGHT JUST DIDN'T LAST AND YOU DIDN'T HAVE MONEY TO GET MORE.: NEVER TRUE

## 2022-12-21 SDOH — ECONOMIC STABILITY: HOUSING INSECURITY: IN THE LAST 12 MONTHS, HOW MANY PLACES HAVE YOU LIVED?: 1

## 2022-12-21 SDOH — ECONOMIC STABILITY: TRANSPORTATION INSECURITY
IN THE PAST 12 MONTHS, HAS LACK OF TRANSPORTATION KEPT YOU FROM MEETINGS, WORK, OR FROM GETTING THINGS NEEDED FOR DAILY LIVING?: NO

## 2022-12-21 SDOH — HEALTH STABILITY: PHYSICAL HEALTH: ON AVERAGE, HOW MANY MINUTES DO YOU ENGAGE IN EXERCISE AT THIS LEVEL?: 60 MIN

## 2022-12-21 SDOH — ECONOMIC STABILITY: HOUSING INSECURITY
IN THE LAST 12 MONTHS, WAS THERE A TIME WHEN YOU DID NOT HAVE A STEADY PLACE TO SLEEP OR SLEPT IN A SHELTER (INCLUDING NOW)?: NO

## 2022-12-21 SDOH — ECONOMIC STABILITY: INCOME INSECURITY: HOW HARD IS IT FOR YOU TO PAY FOR THE VERY BASICS LIKE FOOD, HOUSING, MEDICAL CARE, AND HEATING?: NOT HARD AT ALL

## 2022-12-21 SDOH — ECONOMIC STABILITY: TRANSPORTATION INSECURITY
IN THE PAST 12 MONTHS, HAS LACK OF RELIABLE TRANSPORTATION KEPT YOU FROM MEDICAL APPOINTMENTS, MEETINGS, WORK OR FROM GETTING THINGS NEEDED FOR DAILY LIVING?: NO

## 2022-12-21 SDOH — HEALTH STABILITY: PHYSICAL HEALTH: ON AVERAGE, HOW MANY DAYS PER WEEK DO YOU ENGAGE IN MODERATE TO STRENUOUS EXERCISE (LIKE A BRISK WALK)?: 5 DAYS

## 2022-12-21 SDOH — HEALTH STABILITY: MENTAL HEALTH
STRESS IS WHEN SOMEONE FEELS TENSE, NERVOUS, ANXIOUS, OR CAN'T SLEEP AT NIGHT BECAUSE THEIR MIND IS TROUBLED. HOW STRESSED ARE YOU?: ONLY A LITTLE

## 2022-12-21 SDOH — ECONOMIC STABILITY: INCOME INSECURITY: IN THE LAST 12 MONTHS, WAS THERE A TIME WHEN YOU WERE NOT ABLE TO PAY THE MORTGAGE OR RENT ON TIME?: NO

## 2022-12-21 ASSESSMENT — LIFESTYLE VARIABLES
AUDIT-C TOTAL SCORE: 6
HOW OFTEN DO YOU HAVE SIX OR MORE DRINKS ON ONE OCCASION: MONTHLY
SKIP TO QUESTIONS 9-10: 0
HOW MANY STANDARD DRINKS CONTAINING ALCOHOL DO YOU HAVE ON A TYPICAL DAY: 3 OR 4
HOW OFTEN DO YOU HAVE A DRINK CONTAINING ALCOHOL: 2-3 TIMES A WEEK

## 2022-12-21 ASSESSMENT — SOCIAL DETERMINANTS OF HEALTH (SDOH)
IN A TYPICAL WEEK, HOW MANY TIMES DO YOU TALK ON THE PHONE WITH FAMILY, FRIENDS, OR NEIGHBORS?: MORE THAN THREE TIMES A WEEK
HOW OFTEN DO YOU ATTEND CHURCH OR RELIGIOUS SERVICES?: 1 TO 4 TIMES PER YEAR
HOW HARD IS IT FOR YOU TO PAY FOR THE VERY BASICS LIKE FOOD, HOUSING, MEDICAL CARE, AND HEATING?: NOT HARD AT ALL
HOW OFTEN DO YOU HAVE A DRINK CONTAINING ALCOHOL: 2-3 TIMES A WEEK
HOW OFTEN DO YOU HAVE SIX OR MORE DRINKS ON ONE OCCASION: MONTHLY
HOW OFTEN DO YOU GET TOGETHER WITH FRIENDS OR RELATIVES?: MORE THAN THREE TIMES A WEEK
DO YOU BELONG TO ANY CLUBS OR ORGANIZATIONS SUCH AS CHURCH GROUPS UNIONS, FRATERNAL OR ATHLETIC GROUPS, OR SCHOOL GROUPS?: NO
WITHIN THE PAST 12 MONTHS, YOU WORRIED THAT YOUR FOOD WOULD RUN OUT BEFORE YOU GOT THE MONEY TO BUY MORE: NEVER TRUE
HOW MANY DRINKS CONTAINING ALCOHOL DO YOU HAVE ON A TYPICAL DAY WHEN YOU ARE DRINKING: 3 OR 4
HOW OFTEN DO YOU GET TOGETHER WITH FRIENDS OR RELATIVES?: MORE THAN THREE TIMES A WEEK
HOW OFTEN DO YOU ATTENT MEETINGS OF THE CLUB OR ORGANIZATION YOU BELONG TO?: NEVER
HOW OFTEN DO YOU ATTEND CHURCH OR RELIGIOUS SERVICES?: 1 TO 4 TIMES PER YEAR
HOW OFTEN DO YOU ATTENT MEETINGS OF THE CLUB OR ORGANIZATION YOU BELONG TO?: NEVER
DO YOU BELONG TO ANY CLUBS OR ORGANIZATIONS SUCH AS CHURCH GROUPS UNIONS, FRATERNAL OR ATHLETIC GROUPS, OR SCHOOL GROUPS?: NO
IN A TYPICAL WEEK, HOW MANY TIMES DO YOU TALK ON THE PHONE WITH FAMILY, FRIENDS, OR NEIGHBORS?: MORE THAN THREE TIMES A WEEK

## 2022-12-21 ASSESSMENT — FIBROSIS 4 INDEX: FIB4 SCORE: 0.73

## 2022-12-22 NOTE — ASSESSMENT & PLAN NOTE
Resolved. Reports that he had followed [facility] for 6 months with a starting weight of 240 pounds, 189 pounds currently.  States that he is eating healthier and exercising regularly with a 2 mile walk per day at lunch, mountain biking, and weightlifting every morning.  Reports that his goal weight was 195 pounds, states that he got to 170 pounds, but feels his best at 180-185 pounds.

## 2022-12-22 NOTE — ASSESSMENT & PLAN NOTE
Chronic, stable.  Most recent vitamin D level 63.  Continue over-the-counter vitamin D3 5000 units daily.

## 2022-12-22 NOTE — ASSESSMENT & PLAN NOTE
Chronic, improving.  Not on medication for this issue, does continue omega-3 fatty acid daily.  Reports that he had followed Noom for 6 months with a starting weight of 240 pounds, 189 pounds currently.  States that he is eating healthier and exercising regularly with a 2 mile walk per day at lunch, mountain biking, and weightlifting every morning.  Reports that his goal weight was 195 pounds, states that he got to 170 pounds, but feels his best at 180-185 pounds.  The 10-year ASCVD risk score (Courtney MEHTA, et al., 2019) is: 3.4%

## 2022-12-22 NOTE — ASSESSMENT & PLAN NOTE
Chronic, ongoing.  Continue fluticasone nasal spray daily, reports that the medication makes a big difference in symptoms.

## 2022-12-22 NOTE — PROGRESS NOTES
Subjective:   CC:   Chief Complaint   Patient presents with    Annual Exam    Lab Results     HPI:   Mont[place] Go is a 47 y.o. male who presents for annual exam    Essential hypertension  Chronic, ongoing.  Continue lisinopril 5 mg every bedtime and metoprolol SR 25 mg every bedtime, denies side effects of the medication.  Does not monitor blood pressure at home.  Interested in discontinuing medications if possible.    Mixed hyperlipidemia  Chronic, improving.  Not on medication for this issue, does continue omega-3 fatty acid daily.  Reports that he had followed Noom for 6 months with a starting weight of 240 pounds, 189 pounds currently.  States that he is eating healthier and exercising regularly with a 2 mile walk per day at lunch, mountain biking, and weightlifting every morning.  Reports that his goal weight was 195 pounds, states that he got to 170 pounds, but feels his best at 180-185 pounds.  The 10-year ASCVD risk score (Courtney MEHTA, et al., 2019) is: 3.4%     Overweight (BMI 25.0-29.9)  Resolved. Reports that he had followed Noom for 6 months with a starting weight of 240 pounds, 189 pounds currently.  States that he is eating healthier and exercising regularly with a 2 mile walk per day at lunch, mountain biking, and weightlifting every morning.  Reports that his goal weight was 195 pounds, states that he got to 170 pounds, but feels his best at 180-185 pounds.    Gastroesophageal reflux disease without esophagitis  Chronic, improving.  Continues over-the-counter famotidine as needed.    Vitamin D deficiency  Chronic, stable.  Most recent vitamin D level 63.  Continue over-the-counter vitamin D3 5000 units daily.    Heart palpitations  Chronic, stable.  Continue with metoprolol SR 25 mg nightly.  Medication was prescribed because the patient was having increased heart rate at night.  Interested in discontinuing medication.    Environmental allergies  Chronic, ongoing.  Continue fluticasone nasal  spray daily, reports that the medication makes a big difference in symptoms.     Last Colorectal Cancer Screening: NA - declines  Last Tdap: 2018  Received HPV series: Aged out  Hx STDs: No    He  has a past medical history of Allergy, GERD (gastroesophageal reflux disease), Heart palpitations, Hiatal hernia, and Hypertension.  He  has a past surgical history that includes hydrocelectomy adult; vasectomy (2004); eye surgery (Bilateral, 09/2018); and hernia repair (Right).    Family History   Problem Relation Age of Onset    Hypertension Mother     Hyperlipidemia Mother     Hypertension Father     Heart Disease Father         Afib, blood clots, cardiac ablation    Valvular heart disease Father         aortic stenosis    Other Sister         cholecystectomy    Cancer Paternal Aunt         colon ca    Heart Disease Paternal Grandmother         open heart surgery    Other Paternal Grandmother         liver cirrhosis    Cancer Paternal Grandfather     Stroke Cousin     Alcohol/Drug Maternal Uncle     Diabetes Maternal Uncle     Hypertension Maternal Grandfather      Social History     Tobacco Use    Smoking status: Never    Smokeless tobacco: Never   Vaping Use    Vaping Use: Never used   Substance Use Topics    Alcohol use: Yes     Alcohol/week: 3.0 - 3.6 oz     Types: 5 - 6 Cans of beer per week    Drug use: No     He  reports being sexually active and has had partner(s) who are female. He reports using the following method of birth control/protection: Surgical.    Patient Active Problem List    Diagnosis Date Noted    Environmental allergies 10/22/2020    Overweight (BMI 25.0-29.9) 11/04/2019    Gastroesophageal reflux disease without esophagitis 12/03/2018    Heart palpitations 11/05/2018    Mixed hyperlipidemia 11/03/2017    Vitamin D deficiency 11/03/2017    Essential hypertension 11/03/2016     Current Outpatient Medications   Medication Sig Dispense Refill    fluticasone (FLONASE) 50 MCG/ACT nasal spray  "Administer 1 Spray into affected nostril(S) every day. 16 g 3    Omega-3 Fatty Acids (FISH OIL) 1000 MG Cap capsule Take 1,000 mg by mouth every day.      Cholecalciferol (VITAMIN D-3) 5000 units Tab Take  by mouth.      cetirizine (ZYRTEC) 10 MG Tab Take 10 mg by mouth every day.       No current facility-administered medications for this visit.     No Known Allergies    Review of Systems   Constitutional: Negative for fever, chills and malaise/fatigue.   HENT: Negative for congestion.    Eyes: Negative for pain.   Respiratory: Negative for cough and shortness of breath.    Cardiovascular: Negative for chest pain and leg swelling.   Gastrointestinal: Negative for nausea, vomiting, abdominal pain and diarrhea.   Genitourinary: Negative for dysuria and hematuria.   Skin: Negative for rash.   Neurological: Negative for dizziness, focal weakness and headaches.   Endo/Heme/Allergies: Does not bruise/bleed easily.   Psychiatric/Behavioral: Negative for depression.  The patient is not nervous/anxious.      Objective:   /64 (BP Location: Left arm, Patient Position: Sitting, BP Cuff Size: Adult)   Pulse (!) 56   Temp 36.9 °C (98.4 °F) (Temporal)   Resp 16   Ht 1.88 m (6' 2\")   Wt 85.7 kg (189 lb)   SpO2 99%   BMI 24.27 kg/m²      Wt Readings from Last 4 Encounters:   12/21/22 85.7 kg (189 lb)   05/06/22 87.1 kg (192 lb)   04/22/22 83.9 kg (185 lb)   11/10/21 104 kg (230 lb)     Physical Exam:  Constitutional: Well-developed and well-nourished. Not diaphoretic. No distress.   Skin: Skin is warm and dry. No rash noted.  Head: Atraumatic without lesions.  Eyes: Conjunctivae and extraocular motions are normal. Pupils are equal, round, and reactive to light. No scleral icterus.   Ears:  External ears unremarkable. Tympanic membranes clear and intact.  Nose: Nares patent. Septum midline. Turbinates without erythema nor edema. No discharge.   Mouth/Throat: Tongue normal. Oropharynx is clear and moist. Posterior " pharynx without erythema or exudates.  Neck: Supple, trachea midline. Normal range of motion. No thyromegaly present. No lymphadenopathy--cervical or supraclavicular.  Cardiovascular: Regular rate and rhythm, S1 and S2 without murmur, rubs, or gallops.    Respiratory: Effort normal. Clear to auscultation throughout. No adventitious sounds.   Abdomen: Soft, non tender, and without distention. Active bowel sounds in all four quadrants. No rebound, guarding.  Extremities: No cyanosis, clubbing, erythema, nor edema.  Radial pulses intact and symmetric.   Musculoskeletal: All major joints AROM full in all directions without pain.  Neurological: Alert and oriented x 3. Grossly non-focal. Strength and sensation grossly intact.   Psychiatric:  Behavior, mood, and affect are appropriate.    Assessment and Plan:   1. Encounter for well adult exam without abnormal findings  Due for updated annual labs in December 2023 prior to annual follow-up.  - HEMOGLOBIN A1C; Future  - CBC WITH DIFFERENTIAL; Future  - Comp Metabolic Panel; Future  - Lipid Profile; Future  - MICROALBUMIN CREAT RATIO URINE; Future  - TSH WITH REFLEX TO FT4; Future  - VITAMIN D,25 HYDROXY (DEFICIENCY); Future  - HEP C VIRUS ANTIBODY; Future    2. Essential hypertension  Chronic, improved.  Discontinue lisinopril 5 mg daily and metoprolol SR 25 mg daily.  Encourage patient to monitor blood pressure and heart rate regularly, return to be seen if blood pressure increases or increased heart rate returns. Due for updated annual labs in December 2023 prior to annual follow-up.  - CBC WITH DIFFERENTIAL; Future  - Comp Metabolic Panel; Future  - MICROALBUMIN CREAT RATIO URINE; Future  - TSH WITH REFLEX TO FT4; Future    3. Heart palpitations  Chronic, improved.  Discontinue metoprolol SR 25 mg daily.  Encourage patient to monitor blood pressure and heart rate regularly, return anything if blood pressure or heart rate increases. Due for updated annual labs in December  2023 prior to annual follow-up.  - CBC WITH DIFFERENTIAL; Future  - Comp Metabolic Panel; Future  - TSH WITH REFLEX TO FT4; Future    4. Mixed hyperlipidemia  Chronic, improving.  Not on medication for this issue.  Continue healthy diet and regular exercise. Due for updated annual labs in December 2023 prior to annual follow-up.  - Comp Metabolic Panel; Future  - Lipid Profile; Future  - TSH WITH REFLEX TO FT4; Future    5. Overweight (BMI 25.0-29.9)  Resolved, BMI currently 24.27.  Continue healthy diet and regular exercise. Due for updated annual labs in December 2023 prior to annual follow-up.  - HEMOGLOBIN A1C; Future  - CBC WITH DIFFERENTIAL; Future  - Comp Metabolic Panel; Future  - Lipid Profile; Future  - MICROALBUMIN CREAT RATIO URINE; Future  - TSH WITH REFLEX TO FT4; Future    6. Gastroesophageal reflux disease without esophagitis  Chronic, intermittent.  Continue over-the-counter famotidine once daily as needed. Due for updated annual labs in December 2023 prior to annual follow-up.  - CBC WITH DIFFERENTIAL; Future  - Comp Metabolic Panel; Future  - MICROALBUMIN CREAT RATIO URINE; Future  - TSH WITH REFLEX TO FT4; Future  - VITAMIN D,25 HYDROXY (DEFICIENCY); Future    7. Environmental allergies  Chronic, ongoing.  Continue fluticasone nasal spray daily, refill sent to pharmacy. Due for updated annual labs in December 2023 prior to annual follow-up.  - CBC WITH DIFFERENTIAL; Future  - Comp Metabolic Panel; Future  - TSH WITH REFLEX TO FT4; Future  - fluticasone (FLONASE) 50 MCG/ACT nasal spray; Administer 1 Spray into affected nostril(S) every day.  Dispense: 16 g; Refill: 3    8. Vitamin D deficiency  Chronic, ongoing.  Recommend decreasing vitamin D from 5000 units daily to 2000 units daily. Due for updated annual labs in December 2023 prior to annual follow-up.  - VITAMIN D,25 HYDROXY (DEFICIENCY); Future    9. Need for hepatitis C screening test  Due for one-time screening.  - HEP C VIRUS ANTIBODY;  Future    10. Routine health maintenance  Due for updated annual labs in December 2023 prior to annual follow-up.  - HEMOGLOBIN A1C; Future  - CBC WITH DIFFERENTIAL; Future  - Comp Metabolic Panel; Future  - Lipid Profile; Future  - MICROALBUMIN CREAT RATIO URINE; Future  - TSH WITH REFLEX TO FT4; Future  - VITAMIN D,25 HYDROXY (DEFICIENCY); Future  - HEP C VIRUS ANTIBODY; Future    11. Need for vaccination  Given today.  - INFLUENZA VACCINE QUAD INJ (PF)     I have placed the below orders and discussed them with an approved delegating provider. The MA is performing the below orders under the direction of Dr. Cobb.      Health maintenance: Up-to-date  Labs per orders  Immunizations per orders  Patient counseled about skin care, diet, supplements, and exercise.  Discussed diet and exercise, colorectal cancer screening, and adequate intake of calcium and vitamin D     Follow-up: Return in about 1 year (around 12/21/2023) for Preventative Annual, Follow up Labs, As needed.     Please note that this dictation was created using voice recognition software. I have worked with consultants from the vendor as well as technical experts from [employer] to optimize the interface. I have made every reasonable attempt to correct obvious errors, but I expect that there are errors of grammar and possibly content that I did not discover before finalizing the note.

## 2022-12-22 NOTE — ASSESSMENT & PLAN NOTE
Chronic, stable.  Continue with metoprolol SR 25 mg nightly.  Medication was prescribed because the patient was having increased heart rate at night.  Interested in discontinuing medication.

## 2023-12-18 SDOH — HEALTH STABILITY: PHYSICAL HEALTH: ON AVERAGE, HOW MANY DAYS PER WEEK DO YOU ENGAGE IN MODERATE TO STRENUOUS EXERCISE (LIKE A BRISK WALK)?: 5 DAYS

## 2023-12-18 SDOH — HEALTH STABILITY: PHYSICAL HEALTH: ON AVERAGE, HOW MANY MINUTES DO YOU ENGAGE IN EXERCISE AT THIS LEVEL?: 60 MIN

## 2023-12-18 SDOH — ECONOMIC STABILITY: FOOD INSECURITY: WITHIN THE PAST 12 MONTHS, THE FOOD YOU BOUGHT JUST DIDN'T LAST AND YOU DIDN'T HAVE MONEY TO GET MORE.: NEVER TRUE

## 2023-12-18 SDOH — ECONOMIC STABILITY: INCOME INSECURITY: IN THE LAST 12 MONTHS, WAS THERE A TIME WHEN YOU WERE NOT ABLE TO PAY THE MORTGAGE OR RENT ON TIME?: NO

## 2023-12-18 SDOH — ECONOMIC STABILITY: HOUSING INSECURITY: IN THE LAST 12 MONTHS, HOW MANY PLACES HAVE YOU LIVED?: 1

## 2023-12-18 SDOH — ECONOMIC STABILITY: INCOME INSECURITY: HOW HARD IS IT FOR YOU TO PAY FOR THE VERY BASICS LIKE FOOD, HOUSING, MEDICAL CARE, AND HEATING?: NOT HARD AT ALL

## 2023-12-18 SDOH — ECONOMIC STABILITY: FOOD INSECURITY: WITHIN THE PAST 12 MONTHS, YOU WORRIED THAT YOUR FOOD WOULD RUN OUT BEFORE YOU GOT MONEY TO BUY MORE.: NEVER TRUE

## 2023-12-18 ASSESSMENT — SOCIAL DETERMINANTS OF HEALTH (SDOH)
HOW OFTEN DO YOU ATTEND CHURCH OR RELIGIOUS SERVICES?: 1 TO 4 TIMES PER YEAR
HOW OFTEN DO YOU HAVE SIX OR MORE DRINKS ON ONE OCCASION: MONTHLY
IN A TYPICAL WEEK, HOW MANY TIMES DO YOU TALK ON THE PHONE WITH FAMILY, FRIENDS, OR NEIGHBORS?: MORE THAN THREE TIMES A WEEK
HOW OFTEN DO YOU GET TOGETHER WITH FRIENDS OR RELATIVES?: MORE THAN THREE TIMES A WEEK
HOW HARD IS IT FOR YOU TO PAY FOR THE VERY BASICS LIKE FOOD, HOUSING, MEDICAL CARE, AND HEATING?: NOT HARD AT ALL
HOW OFTEN DO YOU HAVE A DRINK CONTAINING ALCOHOL: 2-3 TIMES A WEEK
DO YOU BELONG TO ANY CLUBS OR ORGANIZATIONS SUCH AS CHURCH GROUPS UNIONS, FRATERNAL OR ATHLETIC GROUPS, OR SCHOOL GROUPS?: NO
HOW OFTEN DO YOU GET TOGETHER WITH FRIENDS OR RELATIVES?: MORE THAN THREE TIMES A WEEK
WITHIN THE PAST 12 MONTHS, YOU WORRIED THAT YOUR FOOD WOULD RUN OUT BEFORE YOU GOT THE MONEY TO BUY MORE: NEVER TRUE
HOW OFTEN DO YOU ATTENT MEETINGS OF THE CLUB OR ORGANIZATION YOU BELONG TO?: NEVER
IN A TYPICAL WEEK, HOW MANY TIMES DO YOU TALK ON THE PHONE WITH FAMILY, FRIENDS, OR NEIGHBORS?: MORE THAN THREE TIMES A WEEK
DO YOU BELONG TO ANY CLUBS OR ORGANIZATIONS SUCH AS CHURCH GROUPS UNIONS, FRATERNAL OR ATHLETIC GROUPS, OR SCHOOL GROUPS?: NO
HOW MANY DRINKS CONTAINING ALCOHOL DO YOU HAVE ON A TYPICAL DAY WHEN YOU ARE DRINKING: 3 OR 4
HOW OFTEN DO YOU ATTENT MEETINGS OF THE CLUB OR ORGANIZATION YOU BELONG TO?: NEVER
HOW OFTEN DO YOU ATTEND CHURCH OR RELIGIOUS SERVICES?: 1 TO 4 TIMES PER YEAR

## 2023-12-18 ASSESSMENT — LIFESTYLE VARIABLES
HOW MANY STANDARD DRINKS CONTAINING ALCOHOL DO YOU HAVE ON A TYPICAL DAY: 3 OR 4
HOW OFTEN DO YOU HAVE SIX OR MORE DRINKS ON ONE OCCASION: MONTHLY
AUDIT-C TOTAL SCORE: 6
SKIP TO QUESTIONS 9-10: 0
HOW OFTEN DO YOU HAVE A DRINK CONTAINING ALCOHOL: 2-3 TIMES A WEEK

## 2023-12-20 ENCOUNTER — HOSPITAL ENCOUNTER (OUTPATIENT)
Dept: LAB | Facility: MEDICAL CENTER | Age: 48
End: 2023-12-20
Attending: NURSE PRACTITIONER
Payer: COMMERCIAL

## 2023-12-20 DIAGNOSIS — E55.9 VITAMIN D DEFICIENCY: ICD-10-CM

## 2023-12-20 DIAGNOSIS — K21.9 GASTROESOPHAGEAL REFLUX DISEASE WITHOUT ESOPHAGITIS: ICD-10-CM

## 2023-12-20 DIAGNOSIS — Z00.00 ROUTINE HEALTH MAINTENANCE: ICD-10-CM

## 2023-12-20 DIAGNOSIS — Z91.09 ENVIRONMENTAL ALLERGIES: ICD-10-CM

## 2023-12-20 DIAGNOSIS — E66.3 OVERWEIGHT (BMI 25.0-29.9): ICD-10-CM

## 2023-12-20 DIAGNOSIS — R00.2 PALPITATIONS: ICD-10-CM

## 2023-12-20 DIAGNOSIS — Z11.59 NEED FOR HEPATITIS C SCREENING TEST: ICD-10-CM

## 2023-12-20 DIAGNOSIS — I10 ESSENTIAL HYPERTENSION: ICD-10-CM

## 2023-12-20 DIAGNOSIS — Z00.00 ENCOUNTER FOR WELL ADULT EXAM WITHOUT ABNORMAL FINDINGS: ICD-10-CM

## 2023-12-20 DIAGNOSIS — E78.2 MIXED HYPERLIPIDEMIA: ICD-10-CM

## 2023-12-20 LAB
25(OH)D3 SERPL-MCNC: 70 NG/ML (ref 30–100)
ALBUMIN SERPL BCP-MCNC: 4.4 G/DL (ref 3.2–4.9)
ALBUMIN/GLOB SERPL: 1.9 G/DL
ALP SERPL-CCNC: 37 U/L (ref 30–99)
ALT SERPL-CCNC: 43 U/L (ref 2–50)
ANION GAP SERPL CALC-SCNC: 11 MMOL/L (ref 7–16)
AST SERPL-CCNC: 32 U/L (ref 12–45)
BASOPHILS # BLD AUTO: 1 % (ref 0–1.8)
BASOPHILS # BLD: 0.04 K/UL (ref 0–0.12)
BILIRUB SERPL-MCNC: 0.5 MG/DL (ref 0.1–1.5)
BUN SERPL-MCNC: 19 MG/DL (ref 8–22)
CALCIUM ALBUM COR SERPL-MCNC: 8.6 MG/DL (ref 8.5–10.5)
CALCIUM SERPL-MCNC: 8.9 MG/DL (ref 8.5–10.5)
CHLORIDE SERPL-SCNC: 106 MMOL/L (ref 96–112)
CHOLEST SERPL-MCNC: 214 MG/DL (ref 100–199)
CO2 SERPL-SCNC: 24 MMOL/L (ref 20–33)
CREAT SERPL-MCNC: 1.27 MG/DL (ref 0.5–1.4)
CREAT UR-MCNC: 252.48 MG/DL
EOSINOPHIL # BLD AUTO: 0.07 K/UL (ref 0–0.51)
EOSINOPHIL NFR BLD: 1.7 % (ref 0–6.9)
ERYTHROCYTE [DISTWIDTH] IN BLOOD BY AUTOMATED COUNT: 40.6 FL (ref 35.9–50)
EST. AVERAGE GLUCOSE BLD GHB EST-MCNC: 103 MG/DL
FASTING STATUS PATIENT QL REPORTED: NORMAL
GFR SERPLBLD CREATININE-BSD FMLA CKD-EPI: 69 ML/MIN/1.73 M 2
GLOBULIN SER CALC-MCNC: 2.3 G/DL (ref 1.9–3.5)
GLUCOSE SERPL-MCNC: 90 MG/DL (ref 65–99)
HBA1C MFR BLD: 5.2 % (ref 4–5.6)
HCT VFR BLD AUTO: 45.6 % (ref 42–52)
HCV AB SER QL: NORMAL
HDLC SERPL-MCNC: 61 MG/DL
HGB BLD-MCNC: 15.1 G/DL (ref 14–18)
IMM GRANULOCYTES # BLD AUTO: 0.01 K/UL (ref 0–0.11)
IMM GRANULOCYTES NFR BLD AUTO: 0.2 % (ref 0–0.9)
LDLC SERPL CALC-MCNC: 136 MG/DL
LYMPHOCYTES # BLD AUTO: 1.82 K/UL (ref 1–4.8)
LYMPHOCYTES NFR BLD: 43.3 % (ref 22–41)
MCH RBC QN AUTO: 30.1 PG (ref 27–33)
MCHC RBC AUTO-ENTMCNC: 33.1 G/DL (ref 32.3–36.5)
MCV RBC AUTO: 90.8 FL (ref 81.4–97.8)
MICROALBUMIN UR-MCNC: <1.2 MG/DL
MICROALBUMIN/CREAT UR: NORMAL MG/G (ref 0–30)
MONOCYTES # BLD AUTO: 0.44 K/UL (ref 0–0.85)
MONOCYTES NFR BLD AUTO: 10.5 % (ref 0–13.4)
NEUTROPHILS # BLD AUTO: 1.82 K/UL (ref 1.82–7.42)
NEUTROPHILS NFR BLD: 43.3 % (ref 44–72)
NRBC # BLD AUTO: 0 K/UL
NRBC BLD-RTO: 0 /100 WBC (ref 0–0.2)
PLATELET # BLD AUTO: 253 K/UL (ref 164–446)
PMV BLD AUTO: 10.2 FL (ref 9–12.9)
POTASSIUM SERPL-SCNC: 4 MMOL/L (ref 3.6–5.5)
PROT SERPL-MCNC: 6.7 G/DL (ref 6–8.2)
RBC # BLD AUTO: 5.02 M/UL (ref 4.7–6.1)
SODIUM SERPL-SCNC: 141 MMOL/L (ref 135–145)
TRIGL SERPL-MCNC: 85 MG/DL (ref 0–149)
TSH SERPL DL<=0.005 MIU/L-ACNC: 3.54 UIU/ML (ref 0.38–5.33)
WBC # BLD AUTO: 4.2 K/UL (ref 4.8–10.8)

## 2023-12-20 PROCEDURE — 86803 HEPATITIS C AB TEST: CPT

## 2023-12-20 PROCEDURE — 80053 COMPREHEN METABOLIC PANEL: CPT

## 2023-12-20 PROCEDURE — 84443 ASSAY THYROID STIM HORMONE: CPT

## 2023-12-20 PROCEDURE — 36415 COLL VENOUS BLD VENIPUNCTURE: CPT

## 2023-12-20 PROCEDURE — 83036 HEMOGLOBIN GLYCOSYLATED A1C: CPT

## 2023-12-20 PROCEDURE — 82043 UR ALBUMIN QUANTITATIVE: CPT

## 2023-12-20 PROCEDURE — 82570 ASSAY OF URINE CREATININE: CPT

## 2023-12-20 PROCEDURE — 85025 COMPLETE CBC W/AUTO DIFF WBC: CPT

## 2023-12-20 PROCEDURE — 80061 LIPID PANEL: CPT

## 2023-12-20 PROCEDURE — 82306 VITAMIN D 25 HYDROXY: CPT

## 2023-12-21 ENCOUNTER — OFFICE VISIT (OUTPATIENT)
Dept: MEDICAL GROUP | Facility: PHYSICIAN GROUP | Age: 48
End: 2023-12-21
Payer: COMMERCIAL

## 2023-12-21 VITALS
RESPIRATION RATE: 16 BRPM | HEIGHT: 74 IN | WEIGHT: 198 LBS | BODY MASS INDEX: 25.41 KG/M2 | DIASTOLIC BLOOD PRESSURE: 70 MMHG | TEMPERATURE: 97.6 F | HEART RATE: 62 BPM | OXYGEN SATURATION: 98 % | SYSTOLIC BLOOD PRESSURE: 108 MMHG

## 2023-12-21 DIAGNOSIS — Z11.4 SCREENING FOR HIV WITHOUT PRESENCE OF RISK FACTORS: ICD-10-CM

## 2023-12-21 DIAGNOSIS — R53.83 OTHER FATIGUE: ICD-10-CM

## 2023-12-21 DIAGNOSIS — Z00.00 ENCOUNTER FOR WELL ADULT EXAM WITHOUT ABNORMAL FINDINGS: ICD-10-CM

## 2023-12-21 DIAGNOSIS — Z23 NEED FOR VACCINATION: ICD-10-CM

## 2023-12-21 DIAGNOSIS — Z12.11 SCREENING FOR COLORECTAL CANCER: ICD-10-CM

## 2023-12-21 DIAGNOSIS — Z12.12 SCREENING FOR COLORECTAL CANCER: ICD-10-CM

## 2023-12-21 PROCEDURE — 99396 PREV VISIT EST AGE 40-64: CPT | Mod: 25 | Performed by: NURSE PRACTITIONER

## 2023-12-21 PROCEDURE — 3078F DIAST BP <80 MM HG: CPT | Performed by: NURSE PRACTITIONER

## 2023-12-21 PROCEDURE — 3074F SYST BP LT 130 MM HG: CPT | Performed by: NURSE PRACTITIONER

## 2023-12-21 PROCEDURE — 90471 IMMUNIZATION ADMIN: CPT | Performed by: NURSE PRACTITIONER

## 2023-12-21 PROCEDURE — 90686 IIV4 VACC NO PRSV 0.5 ML IM: CPT | Performed by: NURSE PRACTITIONER

## 2023-12-21 ASSESSMENT — FIBROSIS 4 INDEX: FIB4 SCORE: 0.95

## 2023-12-22 NOTE — PROGRESS NOTES
Subjective:   CC:   Chief Complaint   Patient presents with    Annual Exam     Lab review.     HPI:   Montana Go is a 48 y.o. male who presents for annual exam:     Anticipatory Guidance:  Cholesterol screenin2023   LDL controlled: 136  Diabetes screenin2023   A1c controlled: 5.2%   UALB/CR: WNL  Diet: Recommend more lean meats, fruits, vegetables, whole grains. Has been increasing protein and red meat intake, has also been eating a lot of shrimp.  Exercise: Encourage regular exercise. Exercises every morning to exercise.  Substance abuse: No  Safe in relationship: Yes  Seatbelts, bike/motorcycle helmet: Yes  Sun protection: Yes  Dentist: Due for follow up  Eye doctor: Due for follow up    Cancer Screening:  Colorectal cancer screening: Cologuard ordered today    Infectious Disease Screening/Immunizations:  STI screening: Declines  Chlamydia/Gonorrhea screening: Declines  Hep C screenin2023  HIV screen: Ordered today  Practices safe sex: Yes    Immunizations:   Influenza: 2023   Tetanus: 2018   Pneumonia: NA   Shingrix: NA    RSV: NA   Received HPV series: Aged out    Preventative Care Screening:   Osteoporosis Screening: NA  Tobacco Screening: Never smoker  AAA Screening: NA    He  reports being sexually active and has had partner(s) who are female. He reports using the following method of birth control/protection: Surgical.  He  has a past medical history of Allergy, GERD (gastroesophageal reflux disease), Heart palpitations, Hiatal hernia, and Hypertension.  He  has a past surgical history that includes hydrocelectomy adult; vasectomy (2004); eye surgery (Bilateral, 2018); and hernia repair (Right).    Family History   Problem Relation Age of Onset    Hypertension Mother     Hyperlipidemia Mother     Hypertension Father     Heart Disease Father         Afib, blood clots, cardiac ablation    Valvular heart disease Father         aortic stenosis    Other Sister          cholecystectomy    Cancer Paternal Aunt         colon ca    Heart Disease Paternal Grandmother         open heart surgery    Other Paternal Grandmother         liver cirrhosis    Cancer Paternal Grandfather     Stroke Cousin     Alcohol/Drug Maternal Uncle     Diabetes Maternal Uncle     Hypertension Maternal Grandfather      Social History     Tobacco Use    Smoking status: Never    Smokeless tobacco: Never   Vaping Use    Vaping Use: Never used   Substance Use Topics    Alcohol use: Yes     Alcohol/week: 3.0 - 3.6 oz     Types: 5 - 6 Cans of beer per week    Drug use: No     Patient Active Problem List    Diagnosis Date Noted    Environmental allergies 10/22/2020    Overweight (BMI 25.0-29.9) 11/04/2019    Gastroesophageal reflux disease without esophagitis 12/03/2018    Heart palpitations 11/05/2018    Mixed hyperlipidemia 11/03/2017    Vitamin D deficiency 11/03/2017    Essential hypertension 11/03/2016     Current Outpatient Medications   Medication Sig Dispense Refill    fluticasone (FLONASE) 50 MCG/ACT nasal spray Administer 1 Spray into affected nostril(S) every day. 16 g 3    Omega-3 Fatty Acids (FISH OIL) 1000 MG Cap capsule Take 1,000 mg by mouth every day.      Cholecalciferol (VITAMIN D-3) 5000 units Tab Take  by mouth.      cetirizine (ZYRTEC) 10 MG Tab Take 10 mg by mouth every day.       No current facility-administered medications for this visit.     No Known Allergies    Review of Systems   Constitutional: Negative for fever, chills and malaise/fatigue.   HENT: Negative for congestion.    Eyes: Negative for pain.   Respiratory: Negative for cough and shortness of breath.    Cardiovascular: Negative for chest pain and leg swelling.   Gastrointestinal: Negative for nausea, vomiting, abdominal pain and diarrhea.   Genitourinary: Negative for dysuria and hematuria.   Skin: Negative for rash.   Neurological: Negative for dizziness, focal weakness and headaches.   Endo/Heme/Allergies: Does not  "bruise/bleed easily.   Psychiatric/Behavioral: Negative for depression.  The patient is not nervous/anxious.      Objective:   /70 (BP Location: Left arm, Patient Position: Sitting, BP Cuff Size: Adult)   Pulse 62   Temp 36.4 °C (97.6 °F) (Temporal)   Resp 16   Ht 1.88 m (6' 2\")   Wt 89.8 kg (198 lb)   SpO2 98%   BMI 25.42 kg/m²     Wt Readings from Last 4 Encounters:   12/21/23 89.8 kg (198 lb)   12/21/22 85.7 kg (189 lb)   05/06/22 87.1 kg (192 lb)   04/22/22 83.9 kg (185 lb)     Physical Exam:  Constitutional: Well-developed and well-nourished. Not diaphoretic. No distress.   Skin: Skin is warm and dry. No rash noted.  Head: Atraumatic without lesions.  Eyes: Conjunctivae and extraocular motions are normal. Pupils are equal, round, and reactive to light. No scleral icterus.   Ears:  External ears unremarkable. Tympanic membranes clear and intact.  Nose: Nares patent. Septum midline. Turbinates without erythema nor edema. No discharge.   Mouth/Throat: Tongue normal. Oropharynx is clear and moist. Posterior pharynx without erythema or exudates.  Neck: Supple, trachea midline. Normal range of motion. No thyromegaly present. No lymphadenopathy--cervical or supraclavicular.  Cardiovascular: Regular rate and rhythm, S1 and S2 without murmur, rubs, or gallops.    Respiratory: Effort normal. Clear to auscultation throughout. No adventitious sounds.   Abdomen: Soft, non tender, and without distention. Active bowel sounds in all four quadrants. No rebound, guarding.  Extremities: No cyanosis, clubbing, erythema, nor edema. Radial pulses intact and symmetric.   Musculoskeletal: All major joints AROM full in all directions without pain.  Neurological: Alert and oriented x 3. Grossly non-focal. Strength and sensation grossly intact.   Psychiatric:  Behavior, mood, and affect are appropriate.    Assessment and Plan:   1. Encounter for well adult exam without abnormal findings    2. Other fatigue  New to examiner, " chronic for patient. Reports feeling fatigued regardless of adequate sleeping. Requesting to test testosterone levels.  - Testosterone, Free & Total, Adult Male (w/SHBG); Future    3. Screening for HIV without presence of risk factors  Due for one time screening.  - HIV AG/AB COMBO ASSAY SCREENING; Future    4. Screening for colorectal cancer  Due for screening.  - Cologuard Colon Cancer Screening    5. Need for vaccination  Given today.  - INFLUENZA VACCINE QUAD INJ (PF)     Health maintenance: Up to date   Labs per orders  Immunizations: Per orders  Patient counseled about skin care, diet, supplements, and exercise.  Discussed  adequate intake of calcium and vitamin D, diet and exercise, colorectal cancer screening.     Follow-up: Return in about 1 year (around 12/21/2024) for Preventative Annual, Follow up Labs.     I have placed the below orders and discussed them with an approved delegating provider. The MA is performing the below orders under the direction of Dr. Cobb.      Please note that this dictation was created using voice recognition software. I have worked with consultants from the vendor as well as technical experts from [employer] to optimize the interface. I have made every reasonable attempt to correct obvious errors, but I expect that there are errors of grammar and possibly content that I did not discover before finalizing the note.

## 2023-12-25 ASSESSMENT — PATIENT HEALTH QUESTIONNAIRE - PHQ9: CLINICAL INTERPRETATION OF PHQ2 SCORE: 0

## 2024-01-10 ENCOUNTER — HOSPITAL ENCOUNTER (OUTPATIENT)
Dept: LAB | Facility: MEDICAL CENTER | Age: 49
End: 2024-01-10
Attending: NURSE PRACTITIONER
Payer: COMMERCIAL

## 2024-01-10 DIAGNOSIS — R53.83 OTHER FATIGUE: ICD-10-CM

## 2024-01-10 DIAGNOSIS — Z11.4 SCREENING FOR HIV WITHOUT PRESENCE OF RISK FACTORS: ICD-10-CM

## 2024-01-10 LAB — HIV 1+2 AB+HIV1 P24 AG SERPL QL IA: NORMAL

## 2024-01-10 PROCEDURE — 36415 COLL VENOUS BLD VENIPUNCTURE: CPT

## 2024-01-10 PROCEDURE — 84403 ASSAY OF TOTAL TESTOSTERONE: CPT

## 2024-01-10 PROCEDURE — 84402 ASSAY OF FREE TESTOSTERONE: CPT

## 2024-01-10 PROCEDURE — 87389 HIV-1 AG W/HIV-1&-2 AB AG IA: CPT

## 2024-01-10 PROCEDURE — 84270 ASSAY OF SEX HORMONE GLOBUL: CPT

## 2024-01-11 LAB
SHBG SERPL-SCNC: 52 NMOL/L (ref 17–56)
TESTOST FREE MFR SERPL: 1.5 % (ref 1.6–2.9)
TESTOST FREE SERPL-MCNC: 83 PG/ML (ref 47–244)
TESTOST SERPL-MCNC: 564 NG/DL (ref 300–890)

## 2024-01-30 ENCOUNTER — TELEPHONE (OUTPATIENT)
Dept: MEDICAL GROUP | Facility: PHYSICIAN GROUP | Age: 49
End: 2024-01-30
Payer: COMMERCIAL

## 2024-01-30 DIAGNOSIS — R53.83 OTHER FATIGUE: ICD-10-CM

## 2024-01-30 DIAGNOSIS — R79.89 LOW TESTOSTERONE IN MALE: ICD-10-CM

## 2024-03-28 ENCOUNTER — OFFICE VISIT (OUTPATIENT)
Dept: UROLOGY | Facility: MEDICAL CENTER | Age: 49
End: 2024-03-28
Payer: COMMERCIAL

## 2024-03-28 VITALS
HEIGHT: 74 IN | OXYGEN SATURATION: 96 % | WEIGHT: 190 LBS | DIASTOLIC BLOOD PRESSURE: 89 MMHG | BODY MASS INDEX: 24.38 KG/M2 | TEMPERATURE: 99.2 F | SYSTOLIC BLOOD PRESSURE: 138 MMHG | HEART RATE: 70 BPM

## 2024-03-28 DIAGNOSIS — E29.1 HYPOGONADISM IN MALE: Primary | ICD-10-CM

## 2024-03-28 ASSESSMENT — FIBROSIS 4 INDEX: FIB4 SCORE: 0.96

## 2024-03-28 NOTE — PROGRESS NOTES
Subjective  [place] Go is a 49 y.o. male who presents today for evaluation of hypogonadism. He notes fatigue as his main reason for checking his levels. He also feels it is difficult to maintain his weight.    Labs checked by PCP demonstrated normal free and total T, slightly low % free testosterone    Family History   Problem Relation Age of Onset    Hypertension Mother     Hyperlipidemia Mother     Hypertension Father     Heart Disease Father         Afib, blood clots, cardiac ablation    Valvular heart disease Father         aortic stenosis    Other Sister         cholecystectomy    Cancer Paternal Aunt         colon ca    Heart Disease Paternal Grandmother         open heart surgery    Other Paternal Grandmother         liver cirrhosis    Cancer Paternal Grandfather     Stroke Cousin     Alcohol/Drug Maternal Uncle     Diabetes Maternal Uncle     Hypertension Maternal Grandfather        Social History     Socioeconomic History    Marital status:      Spouse name: Not on file    Number of children: 2    Years of education: Not on file    Highest education level: 12th grade   Occupational History    Not on file   Tobacco Use    Smoking status: Never    Smokeless tobacco: Never   Vaping Use    Vaping Use: Never used   Substance and Sexual Activity    Alcohol use: Yes     Alcohol/week: 3.0 - 3.6 oz     Types: 5 - 6 Cans of beer per week    Drug use: No    Sexual activity: Yes     Partners: Female     Birth control/protection: Surgical     Comment:  22, dtr 16 son 14   Other Topics Concern    Not on file   Social History Narrative    Not on file     Social Determinants of Health     Financial Resource Strain: Low Risk  (12/18/2023)    Overall Financial Resource Strain (CARDIA)     Difficulty of Paying Living Expenses: Not hard at all   Food Insecurity: No Food Insecurity (12/18/2023)    Hunger Vital Sign     Worried About Running Out of Food in the Last Year: Never true     Ran Out of Food  in the Last Year: Never true   Transportation Needs: No Transportation Needs (12/18/2023)    PRAPARE - Transportation     Lack of Transportation (Medical): No     Lack of Transportation (Non-Medical): No   Physical Activity: Sufficiently Active (12/18/2023)    Exercise Vital Sign     Days of Exercise per Week: 5 days     Minutes of Exercise per Session: 60 min   Stress: No Stress Concern Present (12/18/2023)    [employer] - Occupational Stress Questionnaire     Feeling of Stress : Only a little   Social Connections: Moderately Integrated (12/18/2023)    Social Connection and Isolation Panel [NHANES]     Frequency of Communication with Friends and Family: More than three times a week     Frequency of Social Gatherings with Friends and Family: More than three times a week     Attends Orthodoxy Services: 1 to 4 times per year     Active Member of Clubs or Organizations: No     Attends Club or Organization Meetings: Never     Marital Status:    Intimate Partner Violence: Not on file   Housing Stability: Low Risk  (12/18/2023)    Housing Stability Vital Sign     Unable to Pay for Housing in the Last Year: No     Number of Places Lived in the Last Year: 1     Unstable Housing in the Last Year: No       Past Surgical History:   Procedure Laterality Date    EYE SURGERY Bilateral 09/2018    lasik    VASECTOMY  2004    HERNIA REPAIR Right     inguinal    HYDROCELECTOMY ADULT         Past Medical History:   Diagnosis Date    Allergy     GERD (gastroesophageal reflux disease)     Heart palpitations     Hiatal hernia     small    Hypertension        Current Outpatient Medications   Medication Sig Dispense Refill    fluticasone (FLONASE) 50 MCG/ACT nasal spray Administer 1 Spray into affected nostril(S) every day. 16 g 3    Omega-3 Fatty Acids (FISH OIL) 1000 MG Cap capsule Take 1,000 mg by mouth every day.      Cholecalciferol (VITAMIN D-3) 5000 units Tab Take  by mouth.      cetirizine  "(ZYRTEC) 10 MG Tab Take 10 mg by mouth every day.       No current facility-administered medications for this visit.       No Known Allergies    Objective  /89 (BP Location: Right arm, Patient Position: Sitting, BP Cuff Size: Adult)   Pulse 70   Temp 37.3 °C (99.2 °F) (Temporal)   Ht 1.88 m (6' 2\")   Wt 86.2 kg (190 lb)   SpO2 96%   Physical Exam  Constitutional:       Appearance: Normal appearance.   HENT:      Head: Normocephalic and atraumatic.   Eyes:      Extraocular Movements: Extraocular movements intact.      Conjunctiva/sclera: Conjunctivae normal.   Pulmonary:      Effort: No respiratory distress.   Musculoskeletal:      Cervical back: Normal range of motion.   Skin:     General: Skin is warm and dry.   Neurological:      General: No focal deficit present.      Mental Status: He is alert.   Psychiatric:         Mood and Affect: Mood normal.         Labs:   CBC   Lab Results   Component Value Date/Time    WBC 4.2 (L) 12/20/2023 0615    RBC 5.02 12/20/2023 0615    HEMOGLOBIN 15.1 12/20/2023 0615    HEMATOCRIT 45.6 12/20/2023 0615    MCV 90.8 12/20/2023 0615    MCH 30.1 12/20/2023 0615    MCHC 33.1 12/20/2023 0615    RDW 40.6 12/20/2023 0615    MPV 10.2 12/20/2023 0615    LYMPHOCYTES 43.30 (H) 12/20/2023 0615    LYMPHS 1.82 12/20/2023 0615    MONOCYTES 10.50 12/20/2023 0615    MONOS 0.44 12/20/2023 0615    EOSINOPHILS 1.70 12/20/2023 0615    EOS 0.07 12/20/2023 0615    BASOPHILS 1.00 12/20/2023 0615    BASO 0.04 12/20/2023 0615    NRBC 0.00 12/20/2023 0615       BMP   Lab Results   Component Value Date/Time    SODIUM 141 12/20/2023 0615    POTASSIUM 4.0 12/20/2023 0615    CHLORIDE 106 12/20/2023 0615    CO2 24 12/20/2023 0615    GLUCOSE 90 12/20/2023 0615    BUN 19 12/20/2023 0615    CREATININE 1.27 12/20/2023 0615    CALCIUM 8.9 12/20/2023 0615           Imaging:     none    Assessment    Hypogonadism  -We discussed that based on his current labs he does not meet criteria to start testosterone " replacement therapy, but we will recheck his labs and add a few additional labs.   -If his levels were abnormal we discussed some of the treatments available and what the risks/benefits would look like      Plan    Problem List Items Addressed This Visit    None  Visit Diagnoses       Hypogonadism in male    -  Primary    Relevant Orders    Comp Metabolic Panel    CBC WITHOUT DIFFERENTIAL    PROLACTIN    FSH/LH    ESTRADIOL    TESTOSTERONE SERUM          Will call with results

## 2024-04-03 ENCOUNTER — HOSPITAL ENCOUNTER (OUTPATIENT)
Dept: LAB | Facility: MEDICAL CENTER | Age: 49
End: 2024-04-03
Attending: STUDENT IN AN ORGANIZED HEALTH CARE EDUCATION/TRAINING PROGRAM
Payer: COMMERCIAL

## 2024-04-03 DIAGNOSIS — E29.1 HYPOGONADISM IN MALE: ICD-10-CM

## 2024-04-03 LAB
ALBUMIN SERPL BCP-MCNC: 4.6 G/DL (ref 3.2–4.9)
ALBUMIN/GLOB SERPL: 2.1 G/DL
ALP SERPL-CCNC: 37 U/L (ref 30–99)
ALT SERPL-CCNC: 17 U/L (ref 2–50)
ANION GAP SERPL CALC-SCNC: 12 MMOL/L (ref 7–16)
AST SERPL-CCNC: 25 U/L (ref 12–45)
BILIRUB SERPL-MCNC: 0.7 MG/DL (ref 0.1–1.5)
BUN SERPL-MCNC: 22 MG/DL (ref 8–22)
CALCIUM ALBUM COR SERPL-MCNC: 8.6 MG/DL (ref 8.5–10.5)
CALCIUM SERPL-MCNC: 9.1 MG/DL (ref 8.5–10.5)
CHLORIDE SERPL-SCNC: 106 MMOL/L (ref 96–112)
CO2 SERPL-SCNC: 22 MMOL/L (ref 20–33)
CREAT SERPL-MCNC: 1.18 MG/DL (ref 0.5–1.4)
ERYTHROCYTE [DISTWIDTH] IN BLOOD BY AUTOMATED COUNT: 41.9 FL (ref 35.9–50)
ESTRADIOL SERPL-MCNC: 20.3 PG/ML
FSH SERPL-ACNC: 3.8 MIU/ML (ref 1.5–12.4)
GFR SERPLBLD CREATININE-BSD FMLA CKD-EPI: 75 ML/MIN/1.73 M 2
GLOBULIN SER CALC-MCNC: 2.2 G/DL (ref 1.9–3.5)
GLUCOSE SERPL-MCNC: 93 MG/DL (ref 65–99)
HCT VFR BLD AUTO: 46.7 % (ref 42–52)
HGB BLD-MCNC: 15.5 G/DL (ref 14–18)
LH SERPL-ACNC: 2.7 IU/L (ref 1.7–8.6)
MCH RBC QN AUTO: 30.2 PG (ref 27–33)
MCHC RBC AUTO-ENTMCNC: 33.2 G/DL (ref 32.3–36.5)
MCV RBC AUTO: 90.9 FL (ref 81.4–97.8)
PLATELET # BLD AUTO: 242 K/UL (ref 164–446)
PMV BLD AUTO: 10 FL (ref 9–12.9)
POTASSIUM SERPL-SCNC: 4.4 MMOL/L (ref 3.6–5.5)
PROLACTIN SERPL-MCNC: 10.4 NG/ML (ref 2.1–17.7)
PROT SERPL-MCNC: 6.8 G/DL (ref 6–8.2)
RBC # BLD AUTO: 5.14 M/UL (ref 4.7–6.1)
SODIUM SERPL-SCNC: 140 MMOL/L (ref 135–145)
TESTOST SERPL-MCNC: 396 NG/DL (ref 175–781)
WBC # BLD AUTO: 5.3 K/UL (ref 4.8–10.8)

## 2024-04-03 PROCEDURE — 83002 ASSAY OF GONADOTROPIN (LH): CPT

## 2024-04-03 PROCEDURE — 84403 ASSAY OF TOTAL TESTOSTERONE: CPT

## 2024-04-03 PROCEDURE — 84146 ASSAY OF PROLACTIN: CPT

## 2024-04-03 PROCEDURE — 83001 ASSAY OF GONADOTROPIN (FSH): CPT

## 2024-04-03 PROCEDURE — 82670 ASSAY OF TOTAL ESTRADIOL: CPT

## 2024-04-03 PROCEDURE — 36415 COLL VENOUS BLD VENIPUNCTURE: CPT

## 2024-04-03 PROCEDURE — 85027 COMPLETE CBC AUTOMATED: CPT

## 2024-04-03 PROCEDURE — 80053 COMPREHEN METABOLIC PANEL: CPT

## 2024-04-04 ENCOUNTER — PATIENT MESSAGE (OUTPATIENT)
Dept: UROLOGY | Facility: MEDICAL CENTER | Age: 49
End: 2024-04-04
Payer: COMMERCIAL

## 2024-04-04 ENCOUNTER — TELEPHONE (OUTPATIENT)
Dept: UROLOGY | Facility: MEDICAL CENTER | Age: 49
End: 2024-04-04
Payer: COMMERCIAL

## 2024-04-04 DIAGNOSIS — E29.1 HYPOGONADISM IN MALE: ICD-10-CM

## 2024-04-04 RX ORDER — TESTOSTERONE 16.2 MG/G
GEL TRANSDERMAL
Qty: 75 G | Refills: 1 | Status: SHIPPED | OUTPATIENT
Start: 2024-04-04 | End: 2024-06-03

## 2024-08-23 ENCOUNTER — PATIENT MESSAGE (OUTPATIENT)
Dept: UROLOGY | Facility: MEDICAL CENTER | Age: 49
End: 2024-08-23

## 2024-08-23 ENCOUNTER — HOSPITAL ENCOUNTER (OUTPATIENT)
Dept: LAB | Facility: MEDICAL CENTER | Age: 49
End: 2024-08-23
Attending: STUDENT IN AN ORGANIZED HEALTH CARE EDUCATION/TRAINING PROGRAM
Payer: COMMERCIAL

## 2024-08-23 DIAGNOSIS — E29.1 HYPOGONADISM IN MALE: ICD-10-CM

## 2024-08-23 LAB
ALBUMIN SERPL BCP-MCNC: 4.4 G/DL (ref 3.2–4.9)
ALBUMIN/GLOB SERPL: 1.9 G/DL
ALP SERPL-CCNC: 39 U/L (ref 30–99)
ALT SERPL-CCNC: 21 U/L (ref 2–50)
ANION GAP SERPL CALC-SCNC: 10 MMOL/L (ref 7–16)
AST SERPL-CCNC: 27 U/L (ref 12–45)
BILIRUB SERPL-MCNC: 0.6 MG/DL (ref 0.1–1.5)
BUN SERPL-MCNC: 19 MG/DL (ref 8–22)
CALCIUM ALBUM COR SERPL-MCNC: 9 MG/DL (ref 8.5–10.5)
CALCIUM SERPL-MCNC: 9.3 MG/DL (ref 8.5–10.5)
CHLORIDE SERPL-SCNC: 105 MMOL/L (ref 96–112)
CO2 SERPL-SCNC: 23 MMOL/L (ref 20–33)
CREAT SERPL-MCNC: 1.13 MG/DL (ref 0.5–1.4)
ERYTHROCYTE [DISTWIDTH] IN BLOOD BY AUTOMATED COUNT: 41.3 FL (ref 35.9–50)
ESTRADIOL SERPL-MCNC: 11.9 PG/ML
FASTING STATUS PATIENT QL REPORTED: NORMAL
GFR SERPLBLD CREATININE-BSD FMLA CKD-EPI: 79 ML/MIN/1.73 M 2
GLOBULIN SER CALC-MCNC: 2.3 G/DL (ref 1.9–3.5)
GLUCOSE SERPL-MCNC: 101 MG/DL (ref 65–99)
HCT VFR BLD AUTO: 44.4 % (ref 42–52)
HGB BLD-MCNC: 15 G/DL (ref 14–18)
MCH RBC QN AUTO: 30.5 PG (ref 27–33)
MCHC RBC AUTO-ENTMCNC: 33.8 G/DL (ref 32.3–36.5)
MCV RBC AUTO: 90.4 FL (ref 81.4–97.8)
PLATELET # BLD AUTO: 206 K/UL (ref 164–446)
PMV BLD AUTO: 10.4 FL (ref 9–12.9)
POTASSIUM SERPL-SCNC: 4.8 MMOL/L (ref 3.6–5.5)
PROT SERPL-MCNC: 6.7 G/DL (ref 6–8.2)
RBC # BLD AUTO: 4.91 M/UL (ref 4.7–6.1)
SODIUM SERPL-SCNC: 138 MMOL/L (ref 135–145)
TESTOST SERPL-MCNC: 462 NG/DL (ref 175–781)
WBC # BLD AUTO: 3.7 K/UL (ref 4.8–10.8)

## 2024-08-23 PROCEDURE — 82670 ASSAY OF TOTAL ESTRADIOL: CPT

## 2024-08-23 PROCEDURE — 84403 ASSAY OF TOTAL TESTOSTERONE: CPT

## 2024-08-23 PROCEDURE — 84270 ASSAY OF SEX HORMONE GLOBUL: CPT

## 2024-08-23 PROCEDURE — 85027 COMPLETE CBC AUTOMATED: CPT

## 2024-08-23 PROCEDURE — 80053 COMPREHEN METABOLIC PANEL: CPT

## 2024-08-23 PROCEDURE — 36415 COLL VENOUS BLD VENIPUNCTURE: CPT

## 2024-08-25 LAB — SHBG SERPL-SCNC: 42 NMOL/L (ref 19–76)

## 2024-09-04 ENCOUNTER — PATIENT MESSAGE (OUTPATIENT)
Dept: UROLOGY | Facility: MEDICAL CENTER | Age: 49
End: 2024-09-04
Payer: COMMERCIAL

## 2024-09-04 DIAGNOSIS — E29.1 HYPOGONADISM IN MALE: ICD-10-CM

## 2024-09-04 RX ORDER — TESTOSTERONE 1.62 MG/G
GEL TRANSDERMAL
Qty: 75 G | Refills: 2 | Status: SHIPPED | OUTPATIENT
Start: 2024-09-04 | End: 2024-12-03

## 2024-12-23 SDOH — ECONOMIC STABILITY: INCOME INSECURITY: IN THE LAST 12 MONTHS, WAS THERE A TIME WHEN YOU WERE NOT ABLE TO PAY THE MORTGAGE OR RENT ON TIME?: NO

## 2024-12-23 SDOH — HEALTH STABILITY: MENTAL HEALTH
STRESS IS WHEN SOMEONE FEELS TENSE, NERVOUS, ANXIOUS, OR CAN'T SLEEP AT NIGHT BECAUSE THEIR MIND IS TROUBLED. HOW STRESSED ARE YOU?: TO SOME EXTENT

## 2024-12-23 SDOH — HEALTH STABILITY: PHYSICAL HEALTH: ON AVERAGE, HOW MANY MINUTES DO YOU ENGAGE IN EXERCISE AT THIS LEVEL?: 30 MIN

## 2024-12-23 SDOH — HEALTH STABILITY: PHYSICAL HEALTH: ON AVERAGE, HOW MANY DAYS PER WEEK DO YOU ENGAGE IN MODERATE TO STRENUOUS EXERCISE (LIKE A BRISK WALK)?: 5 DAYS

## 2024-12-23 SDOH — ECONOMIC STABILITY: FOOD INSECURITY: WITHIN THE PAST 12 MONTHS, THE FOOD YOU BOUGHT JUST DIDN'T LAST AND YOU DIDN'T HAVE MONEY TO GET MORE.: NEVER TRUE

## 2024-12-23 SDOH — ECONOMIC STABILITY: INCOME INSECURITY: HOW HARD IS IT FOR YOU TO PAY FOR THE VERY BASICS LIKE FOOD, HOUSING, MEDICAL CARE, AND HEATING?: SOMEWHAT HARD

## 2024-12-23 SDOH — ECONOMIC STABILITY: FOOD INSECURITY: WITHIN THE PAST 12 MONTHS, YOU WORRIED THAT YOUR FOOD WOULD RUN OUT BEFORE YOU GOT MONEY TO BUY MORE.: NEVER TRUE

## 2024-12-23 ASSESSMENT — LIFESTYLE VARIABLES
HOW MANY STANDARD DRINKS CONTAINING ALCOHOL DO YOU HAVE ON A TYPICAL DAY: 3 OR 4
HOW OFTEN DO YOU HAVE A DRINK CONTAINING ALCOHOL: 2-3 TIMES A WEEK
HOW OFTEN DO YOU HAVE SIX OR MORE DRINKS ON ONE OCCASION: MONTHLY
AUDIT-C TOTAL SCORE: 6
SKIP TO QUESTIONS 9-10: 0

## 2024-12-23 ASSESSMENT — SOCIAL DETERMINANTS OF HEALTH (SDOH)
HOW OFTEN DO YOU GET TOGETHER WITH FRIENDS OR RELATIVES?: MORE THAN THREE TIMES A WEEK
IN A TYPICAL WEEK, HOW MANY TIMES DO YOU TALK ON THE PHONE WITH FAMILY, FRIENDS, OR NEIGHBORS?: ONCE A WEEK
DO YOU BELONG TO ANY CLUBS OR ORGANIZATIONS SUCH AS CHURCH GROUPS UNIONS, FRATERNAL OR ATHLETIC GROUPS, OR SCHOOL GROUPS?: NO
HOW OFTEN DO YOU ATTENT MEETINGS OF THE CLUB OR ORGANIZATION YOU BELONG TO?: NEVER
HOW OFTEN DO YOU HAVE SIX OR MORE DRINKS ON ONE OCCASION: MONTHLY
HOW OFTEN DO YOU ATTEND CHURCH OR RELIGIOUS SERVICES?: NEVER
WITHIN THE PAST 12 MONTHS, YOU WORRIED THAT YOUR FOOD WOULD RUN OUT BEFORE YOU GOT THE MONEY TO BUY MORE: NEVER TRUE
HOW OFTEN DO YOU HAVE A DRINK CONTAINING ALCOHOL: 2-3 TIMES A WEEK
DO YOU BELONG TO ANY CLUBS OR ORGANIZATIONS SUCH AS CHURCH GROUPS UNIONS, FRATERNAL OR ATHLETIC GROUPS, OR SCHOOL GROUPS?: NO
IN THE PAST 12 MONTHS, HAS THE ELECTRIC, GAS, OIL, OR WATER COMPANY THREATENED TO SHUT OFF SERVICE IN YOUR HOME?: NO
IN A TYPICAL WEEK, HOW MANY TIMES DO YOU TALK ON THE PHONE WITH FAMILY, FRIENDS, OR NEIGHBORS?: ONCE A WEEK
HOW OFTEN DO YOU ATTENT MEETINGS OF THE CLUB OR ORGANIZATION YOU BELONG TO?: NEVER
HOW HARD IS IT FOR YOU TO PAY FOR THE VERY BASICS LIKE FOOD, HOUSING, MEDICAL CARE, AND HEATING?: SOMEWHAT HARD
HOW MANY DRINKS CONTAINING ALCOHOL DO YOU HAVE ON A TYPICAL DAY WHEN YOU ARE DRINKING: 3 OR 4
HOW OFTEN DO YOU GET TOGETHER WITH FRIENDS OR RELATIVES?: MORE THAN THREE TIMES A WEEK
HOW OFTEN DO YOU ATTEND CHURCH OR RELIGIOUS SERVICES?: NEVER

## 2024-12-26 ENCOUNTER — OFFICE VISIT (OUTPATIENT)
Dept: MEDICAL GROUP | Facility: PHYSICIAN GROUP | Age: 49
End: 2024-12-26
Payer: COMMERCIAL

## 2024-12-26 VITALS
SYSTOLIC BLOOD PRESSURE: 116 MMHG | BODY MASS INDEX: 26.75 KG/M2 | DIASTOLIC BLOOD PRESSURE: 70 MMHG | OXYGEN SATURATION: 98 % | HEART RATE: 71 BPM | WEIGHT: 208.4 LBS | TEMPERATURE: 97.7 F | HEIGHT: 74 IN

## 2024-12-26 DIAGNOSIS — Z00.00 ROUTINE HEALTH MAINTENANCE: ICD-10-CM

## 2024-12-26 DIAGNOSIS — Z00.00 ENCOUNTER FOR WELL ADULT EXAM WITHOUT ABNORMAL FINDINGS: ICD-10-CM

## 2024-12-26 DIAGNOSIS — R00.2 PALPITATIONS: ICD-10-CM

## 2024-12-26 DIAGNOSIS — Z91.09 ENVIRONMENTAL ALLERGIES: ICD-10-CM

## 2024-12-26 DIAGNOSIS — E66.3 OVERWEIGHT (BMI 25.0-29.9): ICD-10-CM

## 2024-12-26 DIAGNOSIS — E78.2 MIXED HYPERLIPIDEMIA: ICD-10-CM

## 2024-12-26 DIAGNOSIS — K21.9 GASTROESOPHAGEAL REFLUX DISEASE WITHOUT ESOPHAGITIS: ICD-10-CM

## 2024-12-26 DIAGNOSIS — I10 ESSENTIAL HYPERTENSION: ICD-10-CM

## 2024-12-26 DIAGNOSIS — E55.9 VITAMIN D DEFICIENCY: ICD-10-CM

## 2024-12-26 PROCEDURE — 99396 PREV VISIT EST AGE 40-64: CPT | Performed by: NURSE PRACTITIONER

## 2024-12-26 PROCEDURE — 3078F DIAST BP <80 MM HG: CPT | Performed by: NURSE PRACTITIONER

## 2024-12-26 PROCEDURE — 3074F SYST BP LT 130 MM HG: CPT | Performed by: NURSE PRACTITIONER

## 2024-12-26 RX ORDER — TESTOSTERONE 20.25 MG/1.25G
GEL TOPICAL
COMMUNITY
Start: 2024-11-02

## 2024-12-26 ASSESSMENT — FIBROSIS 4 INDEX: FIB4 SCORE: 1.43

## 2024-12-26 ASSESSMENT — PATIENT HEALTH QUESTIONNAIRE - PHQ9: CLINICAL INTERPRETATION OF PHQ2 SCORE: 0

## 2024-12-26 NOTE — PROGRESS NOTES
Subjective:   CC:   Chief Complaint   Patient presents with    Annual Exam    Requesting Labs     Verbal consent was acquired by the patient to use [employer] ambient listening note generation during this visit Yes    HPI:   [place] Go is a 49 y.o. male who presents for annual exam:    History of Present Illness  The patient presents for a routine checkup.    He has been on testosterone therapy for approximately 6 months, under the care of Dr. Stein. He reports a decrease in fatigue and improved workout performance since starting the therapy. His dosage was reduced following satisfactory blood work results a few months ago. He anticipates needing a refill in about a month and is uncertain if repeat blood work will be required.    He has not had any recent surgeries. He does not vape or smoke cigarettes but occasionally smokes cigars. He reports no exposure to secondhand smoke. He consumes 5 to 6 beers per week and is making efforts to abstain from alcohol during the week. He does not use drugs. He is sexually active and . He has not had labs done since August 2024. He has switched from Flonase to Astepro, which he finds effective. He takes cetirizine, vitamin D, omega-3, and testosterone. He maintains a healthy diet and exercises daily. He reports no impact of alcohol on his job or life. He feels safe in his relationship. He uses seatbelts and sun protection. He is not up-to-date with dental checkups but recently had an eye exam. He declines STD testing. He has completed hepatitis C and HIV screenings. He practices safe sex with one partner. He has received his influenza vaccine and is due for his tetanus vaccine. He is interested in checking his cholesterol levels due to dietary changes. He reports no ear pain. He recently recovered from a severe head cold, which still lingers in his sinuses. He did not have a fever but experienced congestion. He had a mild sore throat for a couple of days. He  reports no difficulty swallowing. He reports no chest pain, palpitations, constipation, diarrhea, blood in urine or stool, or urinary issues.    His heartburn has largely resolved and is now rare, typically linked to specific foods.    He experiences minimal heart palpitations, which he attributes to weight loss.    He continues to have allergies.    SOCIAL HISTORY  He does not vape or smoke cigarettes but occasionally smokes a cigar. He drinks 5 to 6 beers a week. He reports no drug use. He is  and has 2 children.    FAMILY HISTORY  His mother has diverticulosis and had to have a significant portion of her intestine removed, and she is now on a colostomy bag. His father is wearing a heart monitor due to a heart condition. His sister had her gallbladder removed and uses hearing aids. His aunt America had colon cancer and is currently doing well. His daughter had brain surgery for Moyamoya disease. His son is healthy. His wife is finishing treatment for breast cancer.    MEDICATIONS  Current: Astepro, cetirizine, vitamin D, omega-3, testosterone    IMMUNIZATIONS  He has received his influenza vaccine and the first dose of the shingles vaccine. He is due for his second dose of the shingles vaccine and the hepatitis B series.     Anticipatory Guidance:  Cholesterol screenin2023   LDL controlled: 136  Diabetes screenin2023   A1c controlled: 5.2%   UALB/CR: WNL  Diet: Recommend more lean meats, fruits, vegetables, whole grains.   Exercise: Encourage regular exercise.   Substance abuse: No   Safe in relationship: Yes  Seatbelts, bike/motorcycle helmet: Yes  Sun protection: Yes   Dentist: Due for follow up  Eye doctor: Up to date     Cancer Screening:  Colorectal cancer screenin/15/2024 Brianne, due 2027    Infectious Disease Screening/Immunizations:  STI screening: Declines  Chlamydia/Gonorrhea screening: Declines  Hep C screening: Completed  HIV screen: Completed  Practices safe sex:  Yes    Immunizations:   Influenza: Completed   Tetanus: 11/5/2018   Hep B: Plans to get at work   Pneumonia: N/A, due at age 64   Shingrix: Recommended   RSV: NA due at age 74   Received HPV series: Aged out    Preventative Care Screening:   Osteoporosis Screening: NA  Tobacco Screening: Never smoker   AAA Screening: NA    He  reports being sexually active and has had partner(s) who are female. He reports using the following method of birth control/protection: Surgical.  He  has a past medical history of Allergy, GERD (gastroesophageal reflux disease), Heart palpitations, Hiatal hernia, and Hypertension.  He  has a past surgical history that includes hydrocelectomy adult; vasectomy (2004); eye surgery (Bilateral, 09/2018); and hernia repair (Right).    Family History   Problem Relation Age of Onset    Other Problem (diverticulitis) Mother         has a colostomy    Hypertension Mother     Hyperlipidemia Mother     Hypertension Father     Heart Disease Father         Afib, blood clots, cardiac ablation    Valvular heart disease Father         aortic stenosis    Other Sister         cholecystectomy    Hearing loss Sister     Alcohol/Drug Maternal Uncle     Diabetes Maternal Uncle     Cancer Paternal Aunt         colon ca    Colon Cancer Paternal Aunt     Hypertension Maternal Grandfather     Heart Disease Paternal Grandmother         open heart surgery    Other Paternal Grandmother         liver cirrhosis    Cancer Paternal Grandfather     Brain Aneurysm Daughter         vascular bypass moyamoya    Moyamoya disease Daughter     No Known Problems Son     Stroke Cousin      Social History     Tobacco Use    Smoking status: Never    Smokeless tobacco: Never   Vaping Use    Vaping status: Never Used   Substance Use Topics    Alcohol use: Yes     Alcohol/week: 3.0 - 3.6 oz     Types: 5 - 6 Cans of beer per week    Drug use: No     Patient Active Problem List    Diagnosis Date Noted    Environmental allergies 10/22/2020     "Overweight (BMI 25.0-29.9) 11/04/2019    Gastroesophageal reflux disease without esophagitis 12/03/2018    Heart palpitations 11/05/2018    Mixed hyperlipidemia 11/03/2017    Vitamin D deficiency 11/03/2017    Essential hypertension 11/03/2016     Current Outpatient Medications   Medication Sig Dispense Refill    Testosterone 1.62 % Gel APPLY 1 PUMP TOPICALLY ONCE DAILY      Azelastine HCl (ASTEPRO NA) Administer  into affected nostril(S) every day.      Omega-3 Fatty Acids (FISH OIL) 1000 MG Cap capsule Take 1,000 mg by mouth every day.      Cholecalciferol (VITAMIN D-3) 5000 units Tab Take  by mouth.      cetirizine (ZYRTEC) 10 MG Tab Take 10 mg by mouth every day.       No current facility-administered medications for this visit.     No Known Allergies    Review of Systems   Constitutional: Negative for fever, chills and malaise/fatigue.   HENT: Negative for congestion.    Eyes: Negative for pain.   Respiratory: Negative for cough and shortness of breath.    Cardiovascular: Negative for chest pain and leg swelling.   Gastrointestinal: Negative for nausea, vomiting, abdominal pain and diarrhea.   Genitourinary: Negative for dysuria and hematuria.   Skin: Negative for rash.   Neurological: Negative for dizziness, focal weakness and headaches.   Endo/Heme/Allergies: Does not bruise/bleed easily.   Psychiatric/Behavioral: Negative for depression.  The patient is not nervous/anxious.      Objective:   /70 (BP Location: Left arm, Patient Position: Sitting, BP Cuff Size: Adult)   Pulse 71   Temp 36.5 °C (97.7 °F) (Temporal)   Ht 1.88 m (6' 2\")   Wt 94.5 kg (208 lb 6.4 oz)   SpO2 98%   BMI 26.76 kg/m²     Wt Readings from Last 4 Encounters:   12/26/24 94.5 kg (208 lb 6.4 oz)   03/28/24 86.2 kg (190 lb)   12/21/23 89.8 kg (198 lb)   12/21/22 85.7 kg (189 lb)     Physical Exam:  Constitutional: Well-developed and well-nourished. Not diaphoretic. No distress.   Skin: Skin is warm and dry. No rash noted.  Head: " Atraumatic without lesions.  Eyes: Conjunctivae and extraocular motions are normal. Pupils are equal, round, and reactive to light. No scleral icterus.   Ears:  External ears unremarkable. Tympanic membranes clear and intact.  Nose: Nares patent. Septum midline. Turbinates without erythema nor edema. No discharge.   Mouth/Throat: Tongue normal. Oropharynx is clear and moist. Posterior pharynx without erythema or exudates.  Neck: Supple, trachea midline. Normal range of motion. No thyromegaly present. No lymphadenopathy--cervical or supraclavicular.  Cardiovascular: Regular rate and rhythm, S1 and S2 without murmur, rubs, or gallops.    Respiratory: Effort normal. Clear to auscultation throughout. No adventitious sounds.   Abdomen: Soft, non tender, and without distention. Active bowel sounds in all four quadrants. No rebound, guarding.  Extremities: No cyanosis, clubbing, erythema, nor edema. Radial pulses intact and symmetric.   Musculoskeletal: All major joints AROM full in all directions without pain.  Neurological: Alert and oriented x 3. Grossly non-focal. Strength and sensation grossly intact.   Psychiatric:  Behavior, mood, and affect are appropriate.    Assessment and Plan:   1. Encounter for well adult exam without abnormal findings    2. Mixed hyperlipidemia  Chronic, ongoing without medication. Due for updated annual labs.   - Comp Metabolic Panel; Future  - Lipid Profile; Future  - TSH WITH REFLEX TO FT4; Future    3. Overweight (BMI 25.0-29.9)  Chronic, improving.  Encourage diet high in fruits, vegetables, and fiber. And a diet low in salt, refined carbohydrates, cholesterol, saturated fat, and trans fatty acids.    Encourage a minimum of 30 minutes of moderate intensity aerobic exercise (eg, brisk walking) is recommended on five days each week. Or 30 minutes of vigorous-intensity aerobic exercise (eg, jogging) on three days each week.   Patient's body mass index is 26.76 kg/m². Exercise and nutrition  counseling were performed at this visit.  Due for updated annual labs.   - CBC WITH DIFFERENTIAL; Future  - Comp Metabolic Panel; Future  - Lipid Profile; Future  - TSH WITH REFLEX TO FT4; Future    4. Environmental allergies  Chronic, ongoing.  Continue azelastine nasal spray and cetirizine 10 mg daily over-the-counter. Due for updated annual labs. .  - CBC WITH DIFFERENTIAL; Future    5. Vitamin D deficiency  Chronic, ongoing.  Continue over-the-counter vitamin D3 5000 units daily. Due for updated annual labs.   - VITAMIN D,25 HYDROXY (DEFICIENCY); Future    6. Essential hypertension  Improved/resolved.  Does not take medication for this issue. Due for updated annual labs.   - CBC WITH DIFFERENTIAL; Future  - Comp Metabolic Panel; Future  - TSH WITH REFLEX TO FT4; Future    7. Gastroesophageal reflux disease without esophagitis  Improved/resolved.  His heartburn is pretty much resolved and occurs very rarely, usually attributed to specific foods. No further treatment is necessary at this time.Due for updated annual labs.   - CBC WITH DIFFERENTIAL; Future  - Comp Metabolic Panel; Future  - TSH WITH REFLEX TO FT4; Future  - VITAMIN D,25 HYDROXY (DEFICIENCY); Future    8. Heart palpitations  Improved/resolved.  He reports very minimal heart palpitations, which he attributes to weight loss. No further treatment is necessary at this time. Due for updated annual labs.   - CBC WITH DIFFERENTIAL; Future  - TSH WITH REFLEX TO FT4; Future    9. Routine health maintenance  Due for updated annual labs.   - CBC WITH DIFFERENTIAL; Future  - Comp Metabolic Panel; Future  - Lipid Profile; Future  - TSH WITH REFLEX TO FT4; Future  - VITAMIN D,25 HYDROXY (DEFICIENCY); Future      Health maintenance: Up to date   Labs per orders  Immunizations: Declines   Patient counseled about skin care, diet, supplements, and exercise.  Discussed  adequate intake of calcium and vitamin D, diet and exercise, colorectal cancer screening.      Follow-up: Return in about 1 year (around 12/26/2025) for Preventative Annual, Follow up Labs.     Please note that this dictation was created using voice recognition software. I have worked with consultants from the vendor as well as technical experts from [facility] to optimize the interface. I have made every reasonable attempt to correct obvious errors, but I expect that there are errors of grammar and possibly content that I did not discover before finalizing the note.

## 2025-01-07 ENCOUNTER — HOSPITAL ENCOUNTER (OUTPATIENT)
Dept: LAB | Facility: MEDICAL CENTER | Age: 50
End: 2025-01-07
Attending: NURSE PRACTITIONER
Payer: COMMERCIAL

## 2025-01-07 DIAGNOSIS — Z91.09 ENVIRONMENTAL ALLERGIES: ICD-10-CM

## 2025-01-07 DIAGNOSIS — R00.2 PALPITATIONS: ICD-10-CM

## 2025-01-07 DIAGNOSIS — Z00.00 ROUTINE HEALTH MAINTENANCE: ICD-10-CM

## 2025-01-07 DIAGNOSIS — K21.9 GASTROESOPHAGEAL REFLUX DISEASE WITHOUT ESOPHAGITIS: ICD-10-CM

## 2025-01-07 DIAGNOSIS — E78.2 MIXED HYPERLIPIDEMIA: ICD-10-CM

## 2025-01-07 DIAGNOSIS — E66.3 OVERWEIGHT (BMI 25.0-29.9): ICD-10-CM

## 2025-01-07 DIAGNOSIS — E55.9 VITAMIN D DEFICIENCY: ICD-10-CM

## 2025-01-07 DIAGNOSIS — I10 ESSENTIAL HYPERTENSION: ICD-10-CM

## 2025-01-07 LAB
25(OH)D3 SERPL-MCNC: 67 NG/ML (ref 30–100)
ALBUMIN SERPL BCP-MCNC: 4.4 G/DL (ref 3.2–4.9)
ALBUMIN/GLOB SERPL: 1.8 G/DL
ALP SERPL-CCNC: 40 U/L (ref 30–99)
ALT SERPL-CCNC: 21 U/L (ref 2–50)
ANION GAP SERPL CALC-SCNC: 12 MMOL/L (ref 7–16)
AST SERPL-CCNC: 24 U/L (ref 12–45)
BASOPHILS # BLD AUTO: 0.6 % (ref 0–1.8)
BASOPHILS # BLD: 0.03 K/UL (ref 0–0.12)
BILIRUB SERPL-MCNC: 0.6 MG/DL (ref 0.1–1.5)
BUN SERPL-MCNC: 22 MG/DL (ref 8–22)
CALCIUM ALBUM COR SERPL-MCNC: 8.5 MG/DL (ref 8.5–10.5)
CALCIUM SERPL-MCNC: 8.8 MG/DL (ref 8.5–10.5)
CHLORIDE SERPL-SCNC: 105 MMOL/L (ref 96–112)
CHOLEST SERPL-MCNC: 213 MG/DL (ref 100–199)
CO2 SERPL-SCNC: 23 MMOL/L (ref 20–33)
CREAT SERPL-MCNC: 1.19 MG/DL (ref 0.5–1.4)
EOSINOPHIL # BLD AUTO: 0.1 K/UL (ref 0–0.51)
EOSINOPHIL NFR BLD: 2.1 % (ref 0–6.9)
ERYTHROCYTE [DISTWIDTH] IN BLOOD BY AUTOMATED COUNT: 41 FL (ref 35.9–50)
FASTING STATUS PATIENT QL REPORTED: NORMAL
GFR SERPLBLD CREATININE-BSD FMLA CKD-EPI: 74 ML/MIN/1.73 M 2
GLOBULIN SER CALC-MCNC: 2.5 G/DL (ref 1.9–3.5)
GLUCOSE SERPL-MCNC: 94 MG/DL (ref 65–99)
HCT VFR BLD AUTO: 45.5 % (ref 42–52)
HDLC SERPL-MCNC: 58 MG/DL
HGB BLD-MCNC: 15.3 G/DL (ref 14–18)
IMM GRANULOCYTES # BLD AUTO: 0.01 K/UL (ref 0–0.11)
IMM GRANULOCYTES NFR BLD AUTO: 0.2 % (ref 0–0.9)
LDLC SERPL CALC-MCNC: 134 MG/DL
LYMPHOCYTES # BLD AUTO: 1.88 K/UL (ref 1–4.8)
LYMPHOCYTES NFR BLD: 38.8 % (ref 22–41)
MCH RBC QN AUTO: 30.6 PG (ref 27–33)
MCHC RBC AUTO-ENTMCNC: 33.6 G/DL (ref 32.3–36.5)
MCV RBC AUTO: 91 FL (ref 81.4–97.8)
MONOCYTES # BLD AUTO: 0.6 K/UL (ref 0–0.85)
MONOCYTES NFR BLD AUTO: 12.4 % (ref 0–13.4)
NEUTROPHILS # BLD AUTO: 2.22 K/UL (ref 1.82–7.42)
NEUTROPHILS NFR BLD: 45.9 % (ref 44–72)
NRBC # BLD AUTO: 0 K/UL
NRBC BLD-RTO: 0 /100 WBC (ref 0–0.2)
PLATELET # BLD AUTO: 199 K/UL (ref 164–446)
PMV BLD AUTO: 10 FL (ref 9–12.9)
POTASSIUM SERPL-SCNC: 4.3 MMOL/L (ref 3.6–5.5)
PROT SERPL-MCNC: 6.9 G/DL (ref 6–8.2)
RBC # BLD AUTO: 5 M/UL (ref 4.7–6.1)
SODIUM SERPL-SCNC: 140 MMOL/L (ref 135–145)
TRIGL SERPL-MCNC: 105 MG/DL (ref 0–149)
TSH SERPL DL<=0.005 MIU/L-ACNC: 4.05 UIU/ML (ref 0.38–5.33)
WBC # BLD AUTO: 4.8 K/UL (ref 4.8–10.8)

## 2025-01-07 PROCEDURE — 84443 ASSAY THYROID STIM HORMONE: CPT

## 2025-01-07 PROCEDURE — 36415 COLL VENOUS BLD VENIPUNCTURE: CPT

## 2025-01-07 PROCEDURE — 82306 VITAMIN D 25 HYDROXY: CPT

## 2025-01-07 PROCEDURE — 80053 COMPREHEN METABOLIC PANEL: CPT

## 2025-01-07 PROCEDURE — 80061 LIPID PANEL: CPT

## 2025-01-07 PROCEDURE — 85025 COMPLETE CBC W/AUTO DIFF WBC: CPT

## 2025-01-12 DIAGNOSIS — Z00.00 ROUTINE HEALTH MAINTENANCE: ICD-10-CM

## 2025-01-12 DIAGNOSIS — E66.3 OVERWEIGHT (BMI 25.0-29.9): ICD-10-CM

## 2025-01-12 DIAGNOSIS — I10 ESSENTIAL HYPERTENSION: ICD-10-CM

## 2025-01-12 DIAGNOSIS — E78.2 MIXED HYPERLIPIDEMIA: ICD-10-CM

## 2025-01-12 DIAGNOSIS — E55.9 VITAMIN D DEFICIENCY: ICD-10-CM

## 2025-01-12 DIAGNOSIS — K21.9 GASTROESOPHAGEAL REFLUX DISEASE WITHOUT ESOPHAGITIS: ICD-10-CM

## 2025-01-12 NOTE — PROGRESS NOTES
1. Essential hypertension  - CBC WITH DIFFERENTIAL; Future  - Comp Metabolic Panel; Future  - TSH WITH REFLEX TO FT4; Future    2. Gastroesophageal reflux disease without esophagitis  - CBC WITH DIFFERENTIAL; Future  - Comp Metabolic Panel; Future  - TSH WITH REFLEX TO FT4; Future  - VITAMIN D,25 HYDROXY (DEFICIENCY); Future    3. Mixed hyperlipidemia  - Comp Metabolic Panel; Future  - Lipid Profile; Future  - TSH WITH REFLEX TO FT4; Future    4. Overweight (BMI 25.0-29.9)  - CBC WITH DIFFERENTIAL; Future  - Comp Metabolic Panel; Future  - Lipid Profile; Future  - TSH WITH REFLEX TO FT4; Future    5. Vitamin D deficiency  - VITAMIN D,25 HYDROXY (DEFICIENCY); Future    6. Routine health maintenance  - CBC WITH DIFFERENTIAL; Future  - Comp Metabolic Panel; Future  - Lipid Profile; Future  - TSH WITH REFLEX TO FT4; Future  - VITAMIN D,25 HYDROXY (DEFICIENCY); Future       Due for updated annual labs prior to annual follow-up in December 2025.

## 2025-02-27 ENCOUNTER — PATIENT MESSAGE (OUTPATIENT)
Dept: UROLOGY | Facility: MEDICAL CENTER | Age: 50
End: 2025-02-27
Payer: COMMERCIAL

## 2025-02-27 DIAGNOSIS — E29.1 HYPOGONADISM IN MALE: ICD-10-CM

## 2025-03-06 ENCOUNTER — HOSPITAL ENCOUNTER (OUTPATIENT)
Dept: LAB | Facility: MEDICAL CENTER | Age: 50
End: 2025-03-06
Attending: STUDENT IN AN ORGANIZED HEALTH CARE EDUCATION/TRAINING PROGRAM
Payer: COMMERCIAL

## 2025-03-06 DIAGNOSIS — E29.1 HYPOGONADISM IN MALE: ICD-10-CM

## 2025-03-06 LAB
ALBUMIN SERPL BCP-MCNC: 4.3 G/DL (ref 3.2–4.9)
ALBUMIN/GLOB SERPL: 1.7 G/DL
ALP SERPL-CCNC: 34 U/L (ref 30–99)
ALT SERPL-CCNC: 21 U/L (ref 2–50)
ANION GAP SERPL CALC-SCNC: 8 MMOL/L (ref 7–16)
AST SERPL-CCNC: 24 U/L (ref 12–45)
BILIRUB SERPL-MCNC: 0.6 MG/DL (ref 0.1–1.5)
BUN SERPL-MCNC: 22 MG/DL (ref 8–22)
CALCIUM ALBUM COR SERPL-MCNC: 9 MG/DL (ref 8.5–10.5)
CALCIUM SERPL-MCNC: 9.2 MG/DL (ref 8.5–10.5)
CHLORIDE SERPL-SCNC: 108 MMOL/L (ref 96–112)
CO2 SERPL-SCNC: 24 MMOL/L (ref 20–33)
CREAT SERPL-MCNC: 1.29 MG/DL (ref 0.5–1.4)
ERYTHROCYTE [DISTWIDTH] IN BLOOD BY AUTOMATED COUNT: 41.2 FL (ref 35.9–50)
ESTRADIOL SERPL-MCNC: 13.9 PG/ML
FASTING STATUS PATIENT QL REPORTED: NORMAL
GFR SERPLBLD CREATININE-BSD FMLA CKD-EPI: 67 ML/MIN/1.73 M 2
GLOBULIN SER CALC-MCNC: 2.6 G/DL (ref 1.9–3.5)
GLUCOSE SERPL-MCNC: 86 MG/DL (ref 65–99)
HCT VFR BLD AUTO: 43.2 % (ref 42–52)
HGB BLD-MCNC: 14.6 G/DL (ref 14–18)
MCH RBC QN AUTO: 30.1 PG (ref 27–33)
MCHC RBC AUTO-ENTMCNC: 33.8 G/DL (ref 32.3–36.5)
MCV RBC AUTO: 89.1 FL (ref 81.4–97.8)
PLATELET # BLD AUTO: 211 K/UL (ref 164–446)
PMV BLD AUTO: 10.2 FL (ref 9–12.9)
POTASSIUM SERPL-SCNC: 4 MMOL/L (ref 3.6–5.5)
PROT SERPL-MCNC: 6.9 G/DL (ref 6–8.2)
RBC # BLD AUTO: 4.85 M/UL (ref 4.7–6.1)
SODIUM SERPL-SCNC: 140 MMOL/L (ref 135–145)
TESTOST SERPL-MCNC: 467 NG/DL (ref 175–781)
WBC # BLD AUTO: 3.9 K/UL (ref 4.8–10.8)

## 2025-03-06 PROCEDURE — 82670 ASSAY OF TOTAL ESTRADIOL: CPT

## 2025-03-06 PROCEDURE — 80053 COMPREHEN METABOLIC PANEL: CPT

## 2025-03-06 PROCEDURE — 85027 COMPLETE CBC AUTOMATED: CPT

## 2025-03-06 PROCEDURE — 84403 ASSAY OF TOTAL TESTOSTERONE: CPT

## 2025-03-06 PROCEDURE — 36415 COLL VENOUS BLD VENIPUNCTURE: CPT

## 2025-03-07 DIAGNOSIS — E29.1 HYPOGONADISM IN MALE: ICD-10-CM

## 2025-03-07 RX ORDER — TESTOSTERONE 20.25 MG/1.25G
GEL TOPICAL
Qty: 75 G | Refills: 2 | Status: SHIPPED | OUTPATIENT
Start: 2025-03-07 | End: 2025-06-09

## 2025-03-07 RX ORDER — TESTOSTERONE 20.25 MG/1.25G
GEL TOPICAL
Qty: 75 G | Refills: 3 | Status: SHIPPED | OUTPATIENT
Start: 2025-03-07 | End: 2025-03-07